# Patient Record
Sex: FEMALE | Race: BLACK OR AFRICAN AMERICAN | Employment: FULL TIME | URBAN - METROPOLITAN AREA
[De-identification: names, ages, dates, MRNs, and addresses within clinical notes are randomized per-mention and may not be internally consistent; named-entity substitution may affect disease eponyms.]

---

## 2019-01-01 ENCOUNTER — APPOINTMENT (OUTPATIENT)
Dept: CT IMAGING | Age: 49
End: 2019-01-01
Attending: EMERGENCY MEDICINE
Payer: COMMERCIAL

## 2019-01-01 ENCOUNTER — APPOINTMENT (OUTPATIENT)
Dept: CT IMAGING | Age: 49
End: 2019-01-01
Attending: PHYSICIAN ASSISTANT
Payer: COMMERCIAL

## 2019-01-01 ENCOUNTER — HOSPITAL ENCOUNTER (OUTPATIENT)
Age: 49
Setting detail: OBSERVATION
Discharge: HOME | End: 2019-01-03
Attending: EMERGENCY MEDICINE | Admitting: INTERNAL MEDICINE
Payer: COMMERCIAL

## 2019-01-01 DIAGNOSIS — I10 HYPERTENSION, UNSPECIFIED TYPE: ICD-10-CM

## 2019-01-01 DIAGNOSIS — I63.89 CEREBROVASCULAR ACCIDENT (CVA) DUE TO OTHER MECHANISM (HCC): Primary | ICD-10-CM

## 2019-01-01 DIAGNOSIS — R42 VERTIGO: ICD-10-CM

## 2019-01-01 PROBLEM — G45.9 TIA (TRANSIENT ISCHEMIC ATTACK): Status: ACTIVE | Noted: 2019-01-01

## 2019-01-01 LAB
ALBUMIN SERPL-MCNC: 3.5 G/DL (ref 3.5–5)
ALBUMIN/GLOB SERPL: 0.8 {RATIO} (ref 1.1–2.2)
ALP SERPL-CCNC: 119 U/L (ref 45–117)
ALT SERPL-CCNC: 25 U/L (ref 12–78)
AMPHET UR QL SCN: POSITIVE
ANION GAP SERPL CALC-SCNC: 9 MMOL/L (ref 5–15)
APPEARANCE UR: CLEAR
AST SERPL-CCNC: 13 U/L (ref 15–37)
BACTERIA URNS QL MICRO: ABNORMAL /HPF
BARBITURATES UR QL SCN: NEGATIVE
BASOPHILS # BLD: 0 K/UL (ref 0–0.1)
BASOPHILS NFR BLD: 0 % (ref 0–1)
BENZODIAZ UR QL: NEGATIVE
BILIRUB DIRECT SERPL-MCNC: 0.1 MG/DL (ref 0–0.2)
BILIRUB SERPL-MCNC: 0.4 MG/DL (ref 0.2–1)
BILIRUB UR QL: NEGATIVE
BUN SERPL-MCNC: 17 MG/DL (ref 6–20)
BUN/CREAT SERPL: 24 (ref 12–20)
CALCIUM SERPL-MCNC: 8.6 MG/DL (ref 8.5–10.1)
CANNABINOIDS UR QL SCN: NEGATIVE
CHLORIDE SERPL-SCNC: 103 MMOL/L (ref 97–108)
CO2 SERPL-SCNC: 28 MMOL/L (ref 21–32)
COCAINE UR QL SCN: NEGATIVE
COLOR UR: ABNORMAL
CREAT SERPL-MCNC: 0.72 MG/DL (ref 0.55–1.02)
DIFFERENTIAL METHOD BLD: ABNORMAL
DRUG SCRN COMMENT,DRGCM: ABNORMAL
EOSINOPHIL # BLD: 0.2 K/UL (ref 0–0.4)
EOSINOPHIL NFR BLD: 2 % (ref 0–7)
EPITH CASTS URNS QL MICRO: ABNORMAL /LPF
ERYTHROCYTE [DISTWIDTH] IN BLOOD BY AUTOMATED COUNT: 17.2 % (ref 11.5–14.5)
GLOBULIN SER CALC-MCNC: 4.2 G/DL (ref 2–4)
GLUCOSE BLD STRIP.AUTO-MCNC: 100 MG/DL (ref 65–100)
GLUCOSE SERPL-MCNC: 96 MG/DL (ref 65–100)
GLUCOSE UR STRIP.AUTO-MCNC: NEGATIVE MG/DL
HCT VFR BLD AUTO: 39.5 % (ref 35–47)
HGB BLD-MCNC: 13.1 G/DL (ref 11.5–16)
HGB UR QL STRIP: NEGATIVE
HYALINE CASTS URNS QL MICRO: ABNORMAL /LPF (ref 0–5)
IMM GRANULOCYTES # BLD: 0.1 K/UL (ref 0–0.04)
IMM GRANULOCYTES NFR BLD AUTO: 1 % (ref 0–0.5)
INR PPP: 1 (ref 0.9–1.1)
KETONES UR QL STRIP.AUTO: NEGATIVE MG/DL
LEUKOCYTE ESTERASE UR QL STRIP.AUTO: NEGATIVE
LYMPHOCYTES # BLD: 3.3 K/UL (ref 0.8–3.5)
LYMPHOCYTES NFR BLD: 30 % (ref 12–49)
MAGNESIUM SERPL-MCNC: 2.5 MG/DL (ref 1.6–2.4)
MCH RBC QN AUTO: 27.3 PG (ref 26–34)
MCHC RBC AUTO-ENTMCNC: 33.2 G/DL (ref 30–36.5)
MCV RBC AUTO: 82.5 FL (ref 80–99)
METHADONE UR QL: NEGATIVE
MONOCYTES # BLD: 0.7 K/UL (ref 0–1)
MONOCYTES NFR BLD: 7 % (ref 5–13)
NEUTS SEG # BLD: 6.6 K/UL (ref 1.8–8)
NEUTS SEG NFR BLD: 60 % (ref 32–75)
NITRITE UR QL STRIP.AUTO: NEGATIVE
NRBC # BLD: 0 K/UL (ref 0–0.01)
NRBC BLD-RTO: 0 PER 100 WBC
OPIATES UR QL: NEGATIVE
PCP UR QL: NEGATIVE
PH UR STRIP: 7 [PH] (ref 5–8)
PLATELET # BLD AUTO: 263 K/UL (ref 150–400)
PMV BLD AUTO: 10.7 FL (ref 8.9–12.9)
POTASSIUM SERPL-SCNC: 2.6 MMOL/L (ref 3.5–5.1)
PROT SERPL-MCNC: 7.7 G/DL (ref 6.4–8.2)
PROT UR STRIP-MCNC: NEGATIVE MG/DL
PROTHROMBIN TIME: 9.8 SEC (ref 9–11.1)
RBC # BLD AUTO: 4.79 M/UL (ref 3.8–5.2)
RBC #/AREA URNS HPF: ABNORMAL /HPF (ref 0–5)
SERVICE CMNT-IMP: NORMAL
SODIUM SERPL-SCNC: 140 MMOL/L (ref 136–145)
SP GR UR REFRACTOMETRY: 1 (ref 1–1.03)
UA: UC IF INDICATED,UAUC: ABNORMAL
UROBILINOGEN UR QL STRIP.AUTO: 1 EU/DL (ref 0.2–1)
WBC # BLD AUTO: 10.8 K/UL (ref 3.6–11)
WBC URNS QL MICRO: ABNORMAL /HPF (ref 0–4)

## 2019-01-01 PROCEDURE — 70450 CT HEAD/BRAIN W/O DYE: CPT

## 2019-01-01 PROCEDURE — 96372 THER/PROPH/DIAG INJ SC/IM: CPT

## 2019-01-01 PROCEDURE — 74011250636 HC RX REV CODE- 250/636: Performed by: INTERNAL MEDICINE

## 2019-01-01 PROCEDURE — 99285 EMERGENCY DEPT VISIT HI MDM: CPT

## 2019-01-01 PROCEDURE — 82962 GLUCOSE BLOOD TEST: CPT

## 2019-01-01 PROCEDURE — 36415 COLL VENOUS BLD VENIPUNCTURE: CPT

## 2019-01-01 PROCEDURE — 74011250636 HC RX REV CODE- 250/636: Performed by: EMERGENCY MEDICINE

## 2019-01-01 PROCEDURE — 85025 COMPLETE CBC W/AUTO DIFF WBC: CPT

## 2019-01-01 PROCEDURE — 85610 PROTHROMBIN TIME: CPT

## 2019-01-01 PROCEDURE — 65660000000 HC RM CCU STEPDOWN

## 2019-01-01 PROCEDURE — 74011250637 HC RX REV CODE- 250/637: Performed by: INTERNAL MEDICINE

## 2019-01-01 PROCEDURE — 87086 URINE CULTURE/COLONY COUNT: CPT

## 2019-01-01 PROCEDURE — 83735 ASSAY OF MAGNESIUM: CPT

## 2019-01-01 PROCEDURE — 96361 HYDRATE IV INFUSION ADD-ON: CPT

## 2019-01-01 PROCEDURE — 74011250636 HC RX REV CODE- 250/636: Performed by: PHYSICIAN ASSISTANT

## 2019-01-01 PROCEDURE — 96366 THER/PROPH/DIAG IV INF ADDON: CPT

## 2019-01-01 PROCEDURE — 80076 HEPATIC FUNCTION PANEL: CPT

## 2019-01-01 PROCEDURE — 80307 DRUG TEST PRSMV CHEM ANLYZR: CPT

## 2019-01-01 PROCEDURE — 96365 THER/PROPH/DIAG IV INF INIT: CPT

## 2019-01-01 PROCEDURE — 70496 CT ANGIOGRAPHY HEAD: CPT

## 2019-01-01 PROCEDURE — 93005 ELECTROCARDIOGRAM TRACING: CPT

## 2019-01-01 PROCEDURE — 74011250637 HC RX REV CODE- 250/637: Performed by: EMERGENCY MEDICINE

## 2019-01-01 PROCEDURE — 81001 URINALYSIS AUTO W/SCOPE: CPT

## 2019-01-01 PROCEDURE — 99218 HC RM OBSERVATION: CPT

## 2019-01-01 PROCEDURE — 96360 HYDRATION IV INFUSION INIT: CPT

## 2019-01-01 PROCEDURE — 80048 BASIC METABOLIC PNL TOTAL CA: CPT

## 2019-01-01 PROCEDURE — 94762 N-INVAS EAR/PLS OXIMTRY CONT: CPT

## 2019-01-01 PROCEDURE — 74011636320 HC RX REV CODE- 636/320

## 2019-01-01 RX ORDER — LABETALOL HYDROCHLORIDE 5 MG/ML
5 INJECTION, SOLUTION INTRAVENOUS
Status: DISCONTINUED | OUTPATIENT
Start: 2019-01-01 | End: 2019-01-03 | Stop reason: HOSPADM

## 2019-01-01 RX ORDER — POTASSIUM CHLORIDE 1.5 G/1.77G
40 POWDER, FOR SOLUTION ORAL
Status: COMPLETED | OUTPATIENT
Start: 2019-01-01 | End: 2019-01-01

## 2019-01-01 RX ORDER — ONDANSETRON 2 MG/ML
4 INJECTION INTRAMUSCULAR; INTRAVENOUS
Status: DISCONTINUED | OUTPATIENT
Start: 2019-01-01 | End: 2019-01-03 | Stop reason: HOSPADM

## 2019-01-01 RX ORDER — GUAIFENESIN 100 MG/5ML
81 LIQUID (ML) ORAL DAILY
Status: DISCONTINUED | OUTPATIENT
Start: 2019-01-02 | End: 2019-01-03 | Stop reason: HOSPADM

## 2019-01-01 RX ORDER — ATORVASTATIN CALCIUM 40 MG/1
40 TABLET, FILM COATED ORAL
Status: DISCONTINUED | OUTPATIENT
Start: 2019-01-01 | End: 2019-01-03 | Stop reason: HOSPADM

## 2019-01-01 RX ORDER — ENOXAPARIN SODIUM 100 MG/ML
40 INJECTION SUBCUTANEOUS EVERY 12 HOURS
Status: DISCONTINUED | OUTPATIENT
Start: 2019-01-01 | End: 2019-01-03 | Stop reason: HOSPADM

## 2019-01-01 RX ORDER — SODIUM CHLORIDE 0.9 % (FLUSH) 0.9 %
5-10 SYRINGE (ML) INJECTION AS NEEDED
Status: DISCONTINUED | OUTPATIENT
Start: 2019-01-01 | End: 2019-01-03 | Stop reason: HOSPADM

## 2019-01-01 RX ORDER — MECLIZINE HYDROCHLORIDE 25 MG/1
25 TABLET ORAL 3 TIMES DAILY
Status: DISCONTINUED | OUTPATIENT
Start: 2019-01-01 | End: 2019-01-03 | Stop reason: HOSPADM

## 2019-01-01 RX ORDER — POTASSIUM CHLORIDE 7.45 MG/ML
10 INJECTION INTRAVENOUS
Status: COMPLETED | OUTPATIENT
Start: 2019-01-01 | End: 2019-01-01

## 2019-01-01 RX ORDER — SODIUM CHLORIDE 0.9 % (FLUSH) 0.9 %
5-10 SYRINGE (ML) INJECTION EVERY 8 HOURS
Status: DISCONTINUED | OUTPATIENT
Start: 2019-01-01 | End: 2019-01-03 | Stop reason: HOSPADM

## 2019-01-01 RX ORDER — ACETAMINOPHEN 325 MG/1
650 TABLET ORAL
Status: DISCONTINUED | OUTPATIENT
Start: 2019-01-01 | End: 2019-01-03 | Stop reason: HOSPADM

## 2019-01-01 RX ORDER — DIPHENHYDRAMINE HCL 25 MG
25 CAPSULE ORAL
Status: DISCONTINUED | OUTPATIENT
Start: 2019-01-01 | End: 2019-01-03 | Stop reason: HOSPADM

## 2019-01-01 RX ORDER — ACETAMINOPHEN 650 MG/1
650 SUPPOSITORY RECTAL
Status: DISCONTINUED | OUTPATIENT
Start: 2019-01-01 | End: 2019-01-03 | Stop reason: HOSPADM

## 2019-01-01 RX ORDER — ASPIRIN 325 MG
325 TABLET ORAL ONCE
Status: COMPLETED | OUTPATIENT
Start: 2019-01-01 | End: 2019-01-01

## 2019-01-01 RX ORDER — ENOXAPARIN SODIUM 100 MG/ML
40 INJECTION SUBCUTANEOUS EVERY 24 HOURS
Status: DISCONTINUED | OUTPATIENT
Start: 2019-01-01 | End: 2019-01-01

## 2019-01-01 RX ADMIN — MECLIZINE 25 MG: 12.5 TABLET ORAL at 15:53

## 2019-01-01 RX ADMIN — POTASSIUM CHLORIDE 40 MEQ: 1.5 POWDER, FOR SOLUTION ORAL at 15:53

## 2019-01-01 RX ADMIN — MECLIZINE 25 MG: 12.5 TABLET ORAL at 23:01

## 2019-01-01 RX ADMIN — SODIUM CHLORIDE 1000 ML: 900 INJECTION, SOLUTION INTRAVENOUS at 12:15

## 2019-01-01 RX ADMIN — ENOXAPARIN SODIUM 40 MG: 40 INJECTION SUBCUTANEOUS at 15:54

## 2019-01-01 RX ADMIN — Medication 10 ML: at 23:02

## 2019-01-01 RX ADMIN — SODIUM CHLORIDE 1000 ML: 900 INJECTION, SOLUTION INTRAVENOUS at 15:53

## 2019-01-01 RX ADMIN — ASPIRIN 325 MG: 325 TABLET ORAL at 15:53

## 2019-01-01 RX ADMIN — POTASSIUM CHLORIDE 10 MEQ: 7.46 INJECTION, SOLUTION INTRAVENOUS at 15:54

## 2019-01-01 RX ADMIN — ATORVASTATIN CALCIUM 40 MG: 40 TABLET, FILM COATED ORAL at 23:01

## 2019-01-01 RX ADMIN — Medication 10 ML: at 14:06

## 2019-01-01 RX ADMIN — SODIUM CHLORIDE 1000 ML: 900 INJECTION, SOLUTION INTRAVENOUS at 11:38

## 2019-01-01 RX ADMIN — IOPAMIDOL 100 ML: 755 INJECTION, SOLUTION INTRAVENOUS at 10:38

## 2019-01-01 RX ADMIN — POTASSIUM CHLORIDE 10 MEQ: 7.46 INJECTION, SOLUTION INTRAVENOUS at 17:03

## 2019-01-01 NOTE — PROGRESS NOTES
Pharmacy  Enoxaparin (Lovenox®) Monitoring Indication: DVT px 
  
Current Dose: Enoxaparin 40 mg subcutaneously every 24 hours Creatinine Clearance (mL/min): >100 mL/min Current Weight: 108.9 Kg (BMI > 40) Labs: 
Recent Labs 01/01/19 
1051 CREA 0.72 HGB 13.1  INR 1.0 Wt Readings from Last 1 Encounters:  
01/01/19 108.9 kg (240 lb) Ht Readings from Last 1 Encounters:  
01/01/19 152.4 cm (60\") Impression/Plan: · Will adjust enoxaparin to every 12 hour dosing due to patient BMI > 40 per protocol Thanks, DENNYS LevyD 
 
  [email or web address]/Lovenox%20Dose%20Adjustment%20protocol. pdf

## 2019-01-01 NOTE — ED PROVIDER NOTES
EMERGENCY DEPARTMENT HISTORY AND PHYSICAL EXAM 
 
 
Date: 1/1/2019 Patient Name: Tahira Lyons History of Presenting Illness Chief Complaint Patient presents with  
 Headache  
  onset on waking to day  Dizziness History of TIAs, last one 2012 History Provided By: Patient and Patient's  HPI: Tahira Lyons, 51 y.o. female with PMHx significant for HTN, stroke, TIA, presents via EMS to the ED with cc of new onset HA x 2 hours upon waking with associated 2nd and 3rd fingers \"tingling\", light-headedness, and dizziness. She declares hx of TIA with one in 2011 where she experienced vision problems in her R eye and another in 2012 where she experienced R sided weakness. Pt states that she did not experience vertigo sxs with prior TIAs. Pts  noted that she had to be helped into the bed due to difficulty standing. She states she went to bed at midnight last night with no sxs, and woke at 0600 with sxs. Pt denies any recent travel, head injuries, or roller-coaster use. She notes regular nicotine consumption. Pt specifically denies any modifying factors. She specifically denies any nausea or vomiting. There are no other complaints, changes, or physical findings at this time. PCP: Stacy Lira MD 
 
No current facility-administered medications on file prior to encounter. Current Outpatient Medications on File Prior to Encounter Medication Sig Dispense Refill  amLODIPine 10 mg tab 10 mg, olmesartan 40 mg tab 40 mg Take 1 Dose by mouth daily.  topiramate (TOPAMAX) 25 mg tablet Take 25 mg by mouth two (2) times a day.  butalbital-acetaminophen-caffeine (FIORICET) -40 mg per tablet Take 1-2 Tabs by mouth every four (4) hours as needed for Pain. Max Daily Amount: 12 Tabs. 20 Tab 0  
 metoprolol (LOPRESSOR) 100 mg tablet Take 200 mg by mouth daily.  rosuvastatin (CRESTOR) 10 mg tablet Take 10 mg by mouth nightly.  furosemide (LASIX) 40 mg tablet Take  by mouth daily. Past History Past Medical History: 
Past Medical History:  
Diagnosis Date  Essential hypertension   Hypertension  MARIA M (obstructive sleep apnea)  Stroke ([address]  TIA (transient ischemic attack) 2011 Past Surgical History: 
Past Surgical History:  
Procedure Laterality Date  HX  SECTION  1989  
 HX PARTIAL HYSTERECTOMY Family History: 
Family History Problem Relation Age of Onset  Kidney Disease Mother  Stroke Paternal Uncle  Heart Disease Maternal Grandfather  Stroke Paternal Grandmother  Cancer Paternal Grandmother Social History: 
Social History Tobacco Use  Smoking status: Current Every Day Smoker Packs/day: 1.50 Years: 20.00 Pack years: 30.00 Types: Cigarettes Substance Use Topics  Alcohol use: No  
 Drug use: No  
 
 
Allergies: Allergies Allergen Reactions  Pcn [Penicillins] Anaphylaxis Review of Systems Review of Systems Gastrointestinal: Negative for nausea and vomiting. Neurological: Positive for dizziness, light-headedness and headaches. Positive for tingling on 2nd and 3rd fingers. All other systems reviewed and are negative. Physical Exam  
Physical Exam  
Vital signs and nursing notes reviewed CONSTITUTIONAL: Alert, in moderate distress; well-developed; well-nourished. HEAD:  Normocephalic, atraumatic EYES: PERRL; EOM's intact. ENTM: Nose: no rhinorrhea; Throat: no erythema or exudate, mucous membranes moist 
Neck:  Supple. trachea is midline. RESP: Chest clear, equal breath sounds. - W/R/R 
CV: S1 and S2 WNL; No murmurs, gallops or rubs. 2+ radial and DP pulses bilaterally. GI: non-distended, normal bowel sounds, abdomen soft and non-tender. No masses or organomegaly. : No costo-vertebral angle tenderness. BACK:  Non-tender, normal appearance UPPER EXT:  Normal inspection. no joint or soft tissue swelling LOWER EXT: No edema, no calf tenderness. NEURO: Alert and oriented x3, unable to stand at bedside, answers questions correctly, preforms tasks correctly. Slight R sided leg and arm weakness and decreased light touch sensation. No facial droup, no neglect, no dysarthria. SKIN: No rashes; Warm and dry PSYCH: Normal mood, normal affect Diagnostic Study Results Labs - Recent Results (from the past 12 hour(s)) EKG, 12 LEAD, INITIAL Collection Time: 01/01/19 10:37 AM  
Result Value Ref Range Ventricular Rate 74 BPM  
 Atrial Rate 74 BPM  
 P-R Interval 158 ms QRS Duration 86 ms  
 Q-T Interval 404 ms QTC Calculation (Bezet) 448 ms Calculated P Axis 40 degrees Calculated R Axis 21 degrees Calculated T Axis 17 degrees Diagnosis Normal sinus rhythm Possible Left atrial enlargement Nonspecific T wave abnormality When compared with ECG of 02-DEC-2012 11:43, Nonspecific T wave abnormality now evident in Anterior leads CBC WITH AUTOMATED DIFF Collection Time: 01/01/19 10:51 AM  
Result Value Ref Range WBC 10.8 3.6 - 11.0 K/uL  
 RBC 4.79 3.80 - 5.20 M/uL  
 HGB 13.1 11.5 - 16.0 g/dL HCT 39.5 35.0 - 47.0 % MCV 82.5 80.0 - 99.0 FL  
 MCH 27.3 26.0 - 34.0 PG  
 MCHC 33.2 30.0 - 36.5 g/dL  
 RDW 17.2 (H) 11.5 - 14.5 % PLATELET 199 100 - 360 K/uL MPV 10.7 8.9 - 12.9 FL  
 NRBC 0.0 0  WBC ABSOLUTE NRBC 0.00 0.00 - 0.01 K/uL NEUTROPHILS 60 32 - 75 % LYMPHOCYTES 30 12 - 49 % MONOCYTES 7 5 - 13 % EOSINOPHILS 2 0 - 7 % BASOPHILS 0 0 - 1 % IMMATURE GRANULOCYTES 1 (H) 0.0 - 0.5 % ABS. NEUTROPHILS 6.6 1.8 - 8.0 K/UL  
 ABS. LYMPHOCYTES 3.3 0.8 - 3.5 K/UL  
 ABS. MONOCYTES 0.7 0.0 - 1.0 K/UL  
 ABS. EOSINOPHILS 0.2 0.0 - 0.4 K/UL  
 ABS. BASOPHILS 0.0 0.0 - 0.1 K/UL  
 ABS. IMM. GRANS. 0.1 (H) 0.00 - 0.04 K/UL  
 DF AUTOMATED METABOLIC PANEL, BASIC  
 Collection Time: 01/01/19 10:51 AM  
Result Value Ref Range Sodium 140 136 - 145 mmol/L Potassium 2.6 (LL) 3.5 - 5.1 mmol/L Chloride 103 97 - 108 mmol/L  
 CO2 28 21 - 32 mmol/L Anion gap 9 5 - 15 mmol/L Glucose 96 65 - 100 mg/dL BUN 17 6 - 20 MG/DL Creatinine 0.72 0.55 - 1.02 MG/DL  
 BUN/Creatinine ratio 24 (H) 12 - 20 GFR est AA >60 >60 ml/min/1.73m2 GFR est non-AA >60 >60 ml/min/1.73m2 Calcium 8.6 8.5 - 10.1 MG/DL PROTHROMBIN TIME + INR Collection Time: 01/01/19 10:51 AM  
Result Value Ref Range INR 1.0 0.9 - 1.1 Prothrombin time 9.8 9.0 - 11.1 sec HEPATIC FUNCTION PANEL Collection Time: 01/01/19 10:51 AM  
Result Value Ref Range Protein, total 7.7 6.4 - 8.2 g/dL Albumin 3.5 3.5 - 5.0 g/dL Globulin 4.2 (H) 2.0 - 4.0 g/dL A-G Ratio 0.8 (L) 1.1 - 2.2 Bilirubin, total 0.4 0.2 - 1.0 MG/DL Bilirubin, direct 0.1 0.0 - 0.2 MG/DL Alk. phosphatase 119 (H) 45 - 117 U/L  
 AST (SGOT) 13 (L) 15 - 37 U/L  
 ALT (SGPT) 25 12 - 78 U/L  
GLUCOSE, POC Collection Time: 01/01/19 11:21 AM  
Result Value Ref Range Glucose (POC) 100 65 - 100 mg/dL Performed by Charlotte Phelps (SON) URINALYSIS W/ REFLEX CULTURE Collection Time: 01/01/19 11:38 AM  
Result Value Ref Range Color YELLOW/STRAW Appearance CLEAR CLEAR Specific gravity 1.005 1.003 - 1.030    
 pH (UA) 7.0 5.0 - 8.0 Protein NEGATIVE  NEG mg/dL Glucose NEGATIVE  NEG mg/dL Ketone NEGATIVE  NEG mg/dL Bilirubin NEGATIVE  NEG Blood NEGATIVE  NEG Urobilinogen 1.0 0.2 - 1.0 EU/dL Nitrites NEGATIVE  NEG Leukocyte Esterase NEGATIVE  NEG    
 UA:UC IF INDICATED URINE CULTURE ORDERED (A) CNI    
 WBC 0-4 0 - 4 /hpf  
 RBC 0-5 0 - 5 /hpf Epithelial cells FEW FEW /lpf Bacteria 1+ (A) NEG /hpf Hyaline cast 0-2 0 - 5 /lpf Radiologic Studies -  
CTA CODE NEURO HEAD AND NECK W CONT Final Result Impression: No acute large vessel arterial occlusion or significant stenosis. CT CODE NEURO HEAD WO CONTRAST Final Result IMPRESSION: Normal.  
  
  
 
CT Results  (Last 48 hours) 01/01/19 1103  CTA CODE NEURO HEAD AND NECK W CONT Final result Impression:  Impression: No acute large vessel arterial occlusion or significant stenosis. Narrative:  PRELIMINARY REPORT No large vessel occlusion. Preliminary report was provided by Dr. Salina Whaley, the on-call radiologist, at  
11:46 Final report to follow. END PRELIMINARY REPORT  
   
CLINICAL HISTORY: Vertigo and right-sided weakness EXAMINATION:  CT ANGIOGRAPHY HEAD AND NECK COMPARISON: CT head 1/1/2019, 12/2/2012 TECHNIQUE:  Following the uneventful administration of iodinated contrast  
material, axial CT angiography of the head and neck was performed. Delayed axial  
images through the head were also obtained. Coronal and sagittal reconstructions  
were obtained. Manual postprocessing of images was performed. 3-D  Sagittal  
maximal intensity projection images were obtained. 3-D Coronal maximal  
intensity projections were obtained. CT dose reduction was achieved through use  
of a standardized protocol tailored for this examination and automatic exposure  
control for dose modulation. FINDINGS:  
   
Delayed contrast-enhanced head CT: The ventricles are midline without hydrocephalus. There is no acute intra or  
extra-axial hemorrhage. Gray-white matter differentiation is preserved. The  
basal cisterns are clear. The paranasal sinuses are clear. CTA NECK:  
   
Great vessels: Normal arch anatomy with the origins patent. Right subclavian artery: Patent Left subclavian artery: Patent Right common carotid artery: Patent Left common carotid artery: Patent Cervical right internal carotid artery: Patent with no significant stenosis by NASCET criteria. Cervical left internal carotid artery: Patent with no significant stenosis by NASCET criteria. Right vertebral artery: Patent Left vertebral artery: Patent Mild cervical spondylosis CTA HEAD:  
   
Right cavernous internal carotid artery: Patent Left cavernous internal carotid artery: Patent Anterior cerebral arteries: Patent Anterior communicating artery: Patent Right middle cerebral artery: Patent Left middle cerebral artery: Patent Posterior communicating arteries: Left is patent. Right is hypoplastic Posterior cerebral arteries: Patent Basilar artery: Patent Distal vertebral arteries: Patent 01/01/19 1057  CT CODE NEURO HEAD WO CONTRAST Final result Impression:  IMPRESSION: Normal.  
   
  
 Narrative:  EXAM: CT CODE NEURO HEAD WO CONTRAST INDICATION: acute vertigo R sided weakness symptoms COMPARISON: CT 12/2/2012. Sarthak Calle CONTRAST: None. TECHNIQUE: Unenhanced CT of the head was performed using 5 mm images. Brain and  
bone windows were generated. CT dose reduction was achieved through use of a  
standardized protocol tailored for this examination and automatic exposure  
control for dose modulation. FINDINGS:  
The ventricles and sulci are normal in size, shape and configuration and  
midline. There is no significant white matter disease. There is no intracranial  
hemorrhage, extra-axial collection, mass, mass effect or midline shift. The  
basilar cisterns are open. No acute infarct is identified. The bone windows  
demonstrate no abnormalities. The visualized portions of the paranasal sinuses  
and mastoid air cells are clear. Medical Decision Making I am the first provider for this patient.  
 
I reviewed the vital signs, available nursing notes, past medical history, past surgical history, family history and social history. Vital Signs-Reviewed the patient's vital signs. Patient Vitals for the past 12 hrs: 
 Temp Pulse Resp BP SpO2  
01/01/19 0956 97.8 °F (36.6 °C) 82 18 148/82 100 % Pulse Oximetry Analysis - 100% on RA Cardiac Monitor:  
Rate: 82 bpm 
Rhythm: Normal Sinus Rhythm EKG interpretation: (Preliminary) 10:37 Sinus rhythm 74, nml axis, nml interval, no ischemic changes Written by Pietro Parikh, ED Scribe, as dictated by Aishwarya Navarro MD. Records Reviewed: Nursing Notes, Old Medical Records, Ambulance Run Sheet, Previous Radiology Studies and Previous Laboratory Studies Provider Notes (Medical Decision Making):  
Pt is a 50 yo female with concern for stroke with waking sxs this AM. Pt did not arrive in ED until 10 hours after last known well time. No evidence of large vessel occlusion on CTA, not a candidate for TPA given timeline. Plan for permissive HTN, aspirin therapy, admission for further neurological work-up. ED Course:  
Initial assessment performed. The patients presenting problems have been discussed, and they are in agreement with the care plan formulated and outlined with them. I have encouraged them to ask questions as they arise throughout their visit. Davina Barboza 1970 Arrival time to ED: 09:52 Code S Called: 10:37 Physician at Bedside: 10:25 CT Order Time: 10:36 ACT Page: 10:41 ACT Call Back: 10:45 Cancel Code S: 10:46 CONSULT NOTE:  
10:45 AM 
Aman Miranda MD spoke with Dr. Geovanna Gasca, Specialty: Neurology Discussed pt's hx, disposition, and available diagnostic and imaging results. Reviewed care plans. Consultant agrees with plans as outlined. Dr. Geovanna Gasca agrees with plan for CT. Written by Pietro Parikh, ED Scribe, as dictated by Aman Miranda MD. 
 
CONSULT NOTE:  
1:18 PM 
Aman Miranda MD spoke with Dr. Carlitos Fernandez, Specialty: Hospitalist 
 Discussed pt's hx, disposition, and available diagnostic and imaging results. Reviewed care plans. Consultant will evaluate pt for admission. Written by Manuela Lawrence ED Scribe, as dictated by Joel Kate MD. Critical Care Time:  
0 minutes. Disposition: 
ADMIT NOTE: 
1:18 PM 
The patient is being admitted to the hospital by Dr. Rosa Mckeon. The results of their tests and reasons for their admission have been discussed with the patient and/or available family. They convey agreement and understanding for the need to be admitted and for their admission diagnosis. PLAN: 
1. Admit to hospitalist. 
 
 
Diagnosis Clinical Impression: 1. Cerebrovascular accident (CVA) due to other mechanism 2. Vertigo 3. Hypertension, unspecified type Attestations: This note is prepared by Manuela Lawrence, acting as Scribe for Joel Kate MD. 
 
Joel Kate MD: The scribe's documentation has been prepared under my direction and personally reviewed by me in its entirety. I confirm that the note above accurately reflects all work, treatment, procedures, and medical decision making performed by me.

## 2019-01-01 NOTE — PROGRESS NOTES
TRANSFER - IN REPORT: 
 
Verbal report received from Bi Wang R.N(name) on Robert Torres  being received from ed (unit) for routine progression of care Report consisted of patients Situation, Background, Assessment and  
Recommendations(SBAR). Information from the following report(s) SBAR, Kardex, ED Summary, MAR, Recent Results and Cardiac Rhythm NSR was reviewed with the receiving nurse. Opportunity for questions and clarification was provided. Assessment completed upon patients arrival to unit and care assumed.

## 2019-01-01 NOTE — ED NOTES
10:06 AM 
ePpe Haynes PA-C  has evaluated the patient as the [facility] provider. They have reviewed the vital signs and the triage nurse assessment. They have talked with the patient and any available family and advised that the appropriate studies have been ordered to initiate the work up based on the clinical presentation during the assessment. The pt has been advised that they will be accommodated in the Main ED as soon as possible. Pt is here to be evaluated for a HA with associated tingling to R 3 and 4th finger. Pt states that this HA is different than her usual HAs because \"I can usually do things for myself. \" She describes this as going to the bathroom today and \"then just stopping. \" Her visitor could not help her so he called EMS. Pt has a neurologist that she sees but she cannot recall his name.

## 2019-01-01 NOTE — ED NOTES
TRANSFER - OUT REPORT: 
 
Verbal report given to Erica Gurrola RN (name) on Noemi Cardozo  being transferred to [address] [place] [address] (unit) for routine progression of care Report consisted of patients Situation, Background, Assessment and  
Recommendations(SBAR). Information from the following report(s) SBAR was reviewed with the receiving nurse. Lines:  
Peripheral IV 01/01/19 Left Antecubital (Active) Site Assessment Clean, dry, & intact 1/1/2019 10:57 AM  
Phlebitis Assessment 0 1/1/2019 10:57 AM  
Infiltration Assessment 0 1/1/2019 10:57 AM  
Dressing Status Clean, dry, & intact 1/1/2019 10:57 AM  
  
 
Opportunity for questions and clarification was provided.

## 2019-01-01 NOTE — PROGRESS NOTES
* No surgery found * 
* No surgery found * Bedside and Verbal shift change report given to Milka Frazier R.N  (oncoming nurse) by Dequan Webb R.N (offgoing nurse). Report included the following information ED Summary, MAR and Cardiac Rhythm nsr. Zone Phone:   [phone] Significant changes during shift:  New admit Patient Information Chyrl Severin 51 y.o. 
1/1/2019  9:57 AM by Taurus Martinez MD. Chyrl Severin was admitted from Home 
 
Problem List 
 
Patient Active Problem List  
 Diagnosis Date Noted  TIA (transient ischemic attack) 01/01/2019  Morbid obesity ([facility] [address] 08/05/2013 Past Medical History:  
Diagnosis Date  Essential hypertension 2010  Hypertension  MARIA M (obstructive sleep apnea)  Stroke ([facility] [address]  TIA (transient ischemic attack) 2011 2012 Core Measures: CVA: Yes Yes CHF:No No 
PNA:No No 
 
Post Op Surgical (If Applicable):  
 
Number times ambulated in hallway past shift:  NONE Number of times OOB to chair past shift:   YES 
NG Tube: No 
Incentive Spirometer: No 
Drains: No   Volume Dressing Present:  No 
Flatus:  Not applicable Activity Status: OOB to Chair Yes Ambulated this shift No  
Bed Rest No 
 
Supplemental O2: (If Applicable) NC No 
NRB No 
Venti-mask No 
On  Liters/min LINES AND DRAINS: 
Central Line? No  
 
PICC LINE? No  
 
Urinary Catheter? No  
 
DVT prophylaxis: DVT prophylaxis Med- Yes DVT prophylaxis SCD or ROHITH- No  
 
Wounds: (If Applicable) Wounds- No 
 
Location Patient Safety: 
 
Falls Score Total Score: 1 Safety Level_______ Bed Alarm On? No 
Sitter? No 
 
Plan for upcoming shift: mri,echo Discharge Plan: No  
 
Active Consults: 
IP CONSULT TO NEUROLOGY

## 2019-01-01 NOTE — ED NOTES
Attempted to call report to IP nurse, but she is unavailable and will call the ED as soon as possible. Spoke with Jason Up RN-she is aware that a transport order is being placed now.

## 2019-01-01 NOTE — PROGRESS NOTES
Spiritual Care Assessment/Progress Note [address] 
 
 
NAME: Shady Bone      MRN: [id number] AGE: 51 y.o. SEX: female Mosque Affiliation: Easiest Credit Card To Get Approved For  
Language: [place] 1/1/2019     Total Time (in minutes): 11 Spiritual Assessment begun in [facility] EMERGENCY DEPT through conversation with: 
  
    [x]Patient        [] Family    [] Friend(s) Reason for Consult: Other (comment)(CODE S) Spiritual beliefs: (Please include comment if needed) [x] Identifies with a lalito tradition: Sabianism     
   [] Supported by a lalito community:        
   [] Claims no spiritual orientation:       
   [] Seeking spiritual identity:            
   [] Adheres to an individual form of spirituality:       
   [] Not able to assess:                   
 
    
Identified resources for coping:  
   [] Prayer                           
   [] Music                  [] Guided Imagery [x] Family/friends                 [] Pet visits [] Devotional reading                         [] Unknown 
   [] Other:                                        
 
 
Interventions offered during this visit: (See comments for more details) Family/Friend(s): Affirmation of emotions/emotional suffering, Coping skills reviewed/reinforced, Iconic (affirming the presence of God/Higher Power)(Friend) Plan of Care: 
 
 [x] Support spiritual and/or cultural needs  
 [] Support AMD and/or advance care planning process    
 [] Support grieving process 
 [] Coordinate Rites and/or Rituals  
 [] Coordination with community clergy [] No spiritual needs identified at this time 
 [] Detailed Plan of Care below (See Comments)  [] Make referral to Music Therapy 
[] Make referral to Pet Therapy    
[] Make referral to Addiction services 
[] Make referral to [facility] 
[] Make referral to Spiritual Care Partner 
[] No future visits requested       
[] Follow up visits as needed Comments:  responded to [facility] [place] [address] Level 2. Staff providing care with patient, friend waiting in [place]. Spoke briefly to friend about current thoughts, feelings, concerns. He appeared encouraged as a result of this visit and expressed gratitude for this visit. Visited by Rev. Santo Larsen, [facility]  paging service: [phone])

## 2019-01-01 NOTE — ED NOTES
CATRACHITO Car evaluating Pt reports feeling \"lightheaded\" and having R 2nd and 3rd fingers \"tingling\" ongoing x 8 am upon awakening. +headache x this am Pt reports hx of migraines but states this headache is different stating that with usual migraine headaches she can \"do things for myself\" Pt family member states that he \"couldn't help her\" so he called EMS. Pt also reports hx of TIA

## 2019-01-01 NOTE — H&P
Hospitalist Admission NoteNAME: Lul Grande :  1970 MRN:  [id number] Date/Time:  2019 2:06 PM 
 
Patient PCP: Jeanette Calle MD 
______________________________________________________________________ Given the patient's current clinical presentation, I have a high level of concern for decompensation if discharged from the emergency department. Complex decision making was performed, which includes reviewing the patient's available past medical records, laboratory results, and x-ray films. My assessment of this patient's clinical condition and my plan of care is as follows. Assessment / Plan: 
 
TIA/CVA presenting symptoms: dizizness/blurry vision/rt fingers tingling 
-admit to observation 
-neuro checks per protocol 
elevated head of bed 
-lipid panel, TSH, A1C/t Ua Tox 
-allow for permissive hypertension?with labetalol?prn for? BP >?220/120 Ct head- Normal. 
-CTA No acute large vessel arterial occlusion or significant stenosis. MRI  Brain -ECHO 
- ASA/ statin  
-PT/OT consult 
-neurology consulted 
 - trial meclizine Hypokalemia 
replace prn Check mag Follow labs Hypertension. see abobe resume meds in am  
/a1c Hyperlipidemia. lipids in am  
lipitor Obesity. Will ask nutritionist to see pt  
tob dep declined patch  
  
  
  
Code Status: Full code Surrogate Decision Maker:  
  
DVT Prophylaxis: Lovenox GI Prophylaxis: not indicated 
  
Baseline: ambulatory Subjective: CHIEF COMPLAINT: headache/dizziness/numbness lft arm HISTORY OF PRESENT ILLNESS:    
Lul Grande is a 51 y.o.   female with PMHx significant for HTN, H/O TIA, presents via EMS to the ED with cc of new onset HA x 2 hours upon waking with associated 2nd and 3rd fingers \"tingling\", light-headedness, and dizziness.  
 hx of TIA with one in  where she experienced vision problems in her R eye and another in  where she experienced R sided weakness all resolved. Pt states that she did not experience vertigo sxs with prior TIAs. Pts  noted that she had to be helped into the bed due to difficulty standing. She states she went to bed at midnight last night with no sxs, and woke at 0600 with sxs. Pt denies any recent travel, head injuries, or roller-coaster use. Pt specifically denies any modifying factors. She specifically denies any nausea or vomiting.  
+ smoker. 
 
  
 
 
 
We were asked to admit for work up and evaluation of the above problems. Past Medical History:  
Diagnosis Date  Essential hypertension   Hypertension  MARIA M (obstructive sleep apnea)  [address]roke [address] [address]  TIA (transient ischemic attack) 2011 Past Surgical History:  
Procedure Laterality Date  HX  SECTION  1989  
 HX PARTIAL HYSTERECTOMY Social History Tobacco Use  Smoking status: Current Every Day Smoker Packs/day: 1.50 Years: 20.00 Pack years: 30.00 Types: Cigarettes Substance Use Topics  Alcohol use: No  
  
 
Family History Problem Relation Age of Onset  Kidney Disease Mother  Stroke Paternal Uncle  Heart Disease Maternal Grandfather  Stroke Paternal Grandmother  Cancer Paternal Grandmother Allergies Allergen Reactions  Pcn [Penicillins] Anaphylaxis Prior to Admission medications Medication Sig Start Date End Date Taking? Authorizing Provider  
amLODIPine 10 mg tab 10 mg, olmesartan 40 mg tab 40 mg Take 1 Dose by mouth daily. Other, MD Royer  
topiramate (TOPAMAX) 25 mg tablet Take 25 mg by mouth two (2) times a day. Other, MD Royer  
butalbital-acetaminophen-caffeine (FIORICET) -40 mg per tablet Take 1-2 Tabs by mouth every four (4) hours as needed for Pain. Max Daily Amount: 12 Tabs. 11/22/15   Jeremie Coyne MD  
metoprolol (LOPRESSOR) 100 mg tablet Take 200 mg by mouth daily.     Provider, Historical  
 rosuvastatin (CRESTOR) 10 mg tablet Take 10 mg by mouth nightly. Provider, Historical  
furosemide (LASIX) 40 mg tablet Take  by mouth daily. Provider, Historical  
 
 
REVIEW OF SYSTEMS:    
I am not able to complete the review of systems because: The patient is intubated and sedated The patient has altered mental status due to his acute medical problems The patient has baseline aphasia from prior stroke(s) The patient has baseline dementia and is not reliable historian The patient is in acute medical distress and unable to provide information Total of 12 systems reviewed as follows:   
   POSITIVE= underlined text  Negative = text not underlined General:  fever, chills, sweats, generalized weakness, weight loss/gain,  
   loss of appetite Eyes:    blurred vision, eye pain, loss of vision, double vision ENT:    rhinorrhea, pharyngitis Respiratory:   cough, sputum production, SOB, GANDHI, wheezing, pleuritic pain  
Cardiology:   chest pain, palpitations, orthopnea, PND, edema, syncope Gastrointestinal:  abdominal pain , N/V, diarrhea, dysphagia, constipation, bleeding Genitourinary:  frequency, urgency, dysuria, hematuria, incontinence Muskuloskeletal :  arthralgia, myalgia, back pain Hematology:  easy bruising, nose or gum bleeding, lymphadenopathy Dermatological: rash, ulceration, pruritis, color change / jaundice Endocrine:   hot flashes or polydipsia Neurological:  headache, dizziness, confusion, focal weakness, paresthesia, rt fingers Speech difficulties, memory loss, gait difficulty Psychological: Feelings of anxiety, depression, agitation Objective: VITALS:   
Visit Vitals /77 (BP 1 Location: Right arm, BP Patient Position: At rest) Pulse 86 Temp 97.8 °F (36.6 °C) Resp 19 Ht 5' (1.524 m) Wt 108.9 kg (240 lb) SpO2 97% BMI 46.87 kg/m² PHYSICAL EXAM: 
 
General:    Alert, cooperative, obese  no distress, appears stated age. HEENT: Atraumatic, anicteric sclerae, pink conjunctivae No oral ulcers, mucosa moist, throat clear, dentition fair Neck:  Supple, symmetrical,  thyroid: non tender Lungs:   Clear to auscultation bilaterally. No Wheezing or Rhonchi. No rales. Chest wall:  No tenderness  No Accessory muscle use. Heart:   Regular  rhythm,  No  murmur   No edema Abdomen:   Soft, obese non-tender. Not distended. Bowel sounds normal 
Extremities: No cyanosis. No clubbing,   
  Skin turgor normal, Capillary refill normal, Radial dial pulse 2+ Skin:     Not pale. Not Jaundiced  No rashes Psych:  Fair  insight. Not depressed. Not anxious or agitated. Neurologic: EOMs intact. No facial asymmetry. No aphasia or slurred speech. Symmetrical strength, Sensation grossly intact. Alert and oriented X 4.  
 
_______________________________________________________________________ Care Plan discussed with: 
  Comments Patient x Family  x   
RN x Care Manager Consultant:  x   
_______________________________________________________________________ Expected  Disposition:  
Home with Family x HH/PT/OT/RN   
SNF/LTC   
RAEANN   
________________________________________________________________________ TOTAL TIME:  60  Minutes Critical Care Provided     Minutes non procedure based Comments  
 x Reviewed previous records  
>50% of visit spent in counseling and coordination of care x Discussion with patient and/or family and questions answered 
  
 
________________________________________________________________________ Signed: Sharron Rasmussen MD 
 
Procedures: see electronic medical records for all procedures/Xrays and details which were not copied into this note but were reviewed prior to creation of Plan. LAB DATA REVIEWED:   
Recent Results (from the past 24 hour(s)) EKG, 12 LEAD, INITIAL Collection Time: 01/01/19 10:37 AM  
Result Value Ref Range  Ventricular Rate 74 BPM  
 Atrial Rate 74 BPM  
 P-R Interval 158 ms QRS Duration 86 ms  
 Q-T Interval 404 ms QTC Calculation (Bezet) 448 ms Calculated P Axis 40 degrees Calculated R Axis 21 degrees Calculated T Axis 17 degrees Diagnosis Normal sinus rhythm Possible Left atrial enlargement Nonspecific T wave abnormality When compared with ECG of 02-DEC-2012 11:43, Nonspecific T wave abnormality now evident in Anterior leads CBC WITH AUTOMATED DIFF Collection Time: 01/01/19 10:51 AM  
Result Value Ref Range WBC 10.8 3.6 - 11.0 K/uL  
 RBC 4.79 3.80 - 5.20 M/uL  
 HGB 13.1 11.5 - 16.0 g/dL HCT 39.5 35.0 - 47.0 % MCV 82.5 80.0 - 99.0 FL  
 MCH 27.3 26.0 - 34.0 PG  
 MCHC 33.2 30.0 - 36.5 g/dL  
 RDW 17.2 (H) 11.5 - 14.5 % PLATELET 383 278 - 315 K/uL MPV 10.7 8.9 - 12.9 FL  
 NRBC 0.0 0  WBC ABSOLUTE NRBC 0.00 0.00 - 0.01 K/uL NEUTROPHILS 60 32 - 75 % LYMPHOCYTES 30 12 - 49 % MONOCYTES 7 5 - 13 % EOSINOPHILS 2 0 - 7 % BASOPHILS 0 0 - 1 % IMMATURE GRANULOCYTES 1 (H) 0.0 - 0.5 % ABS. NEUTROPHILS 6.6 1.8 - 8.0 K/UL  
 ABS. LYMPHOCYTES 3.3 0.8 - 3.5 K/UL  
 ABS. MONOCYTES 0.7 0.0 - 1.0 K/UL  
 ABS. EOSINOPHILS 0.2 0.0 - 0.4 K/UL  
 ABS. BASOPHILS 0.0 0.0 - 0.1 K/UL  
 ABS. IMM. GRANS. 0.1 (H) 0.00 - 0.04 K/UL  
 DF AUTOMATED METABOLIC PANEL, BASIC Collection Time: 01/01/19 10:51 AM  
Result Value Ref Range Sodium 140 136 - 145 mmol/L Potassium 2.6 (LL) 3.5 - 5.1 mmol/L Chloride 103 97 - 108 mmol/L  
 CO2 28 21 - 32 mmol/L Anion gap 9 5 - 15 mmol/L Glucose 96 65 - 100 mg/dL BUN 17 6 - 20 MG/DL Creatinine 0.72 0.55 - 1.02 MG/DL  
 BUN/Creatinine ratio 24 (H) 12 - 20 GFR est AA >60 >60 ml/min/1.73m2 GFR est non-AA >60 >60 ml/min/1.73m2 Calcium 8.6 8.5 - 10.1 MG/DL PROTHROMBIN TIME + INR Collection Time: 01/01/19 10:51 AM  
Result Value Ref Range INR 1.0 0.9 - 1.1 Prothrombin time 9.8 9.0 - 11.1 sec HEPATIC FUNCTION PANEL Collection Time: 01/01/19 10:51 AM  
Result Value Ref Range Protein, total 7.7 6.4 - 8.2 g/dL Albumin 3.5 3.5 - 5.0 g/dL Globulin 4.2 (H) 2.0 - 4.0 g/dL A-G Ratio 0.8 (L) 1.1 - 2.2 Bilirubin, total 0.4 0.2 - 1.0 MG/DL Bilirubin, direct 0.1 0.0 - 0.2 MG/DL Alk. phosphatase 119 (H) 45 - 117 U/L  
 AST (SGOT) 13 (L) 15 - 37 U/L  
 ALT (SGPT) 25 12 - 78 U/L  
GLUCOSE, POC Collection Time: 01/01/19 11:21 AM  
Result Value Ref Range Glucose (POC) 100 65 - 100 mg/dL Performed by Ayleen Mcdermott (SON) URINALYSIS W/ REFLEX CULTURE Collection Time: 01/01/19 11:38 AM  
Result Value Ref Range Color YELLOW/STRAW Appearance CLEAR CLEAR Specific gravity 1.005 1.003 - 1.030    
 pH (UA) 7.0 5.0 - 8.0 Protein NEGATIVE  NEG mg/dL Glucose NEGATIVE  NEG mg/dL Ketone NEGATIVE  NEG mg/dL Bilirubin NEGATIVE  NEG Blood NEGATIVE  NEG Urobilinogen 1.0 0.2 - 1.0 EU/dL Nitrites NEGATIVE  NEG Leukocyte Esterase NEGATIVE  NEG    
 UA:UC IF INDICATED URINE CULTURE ORDERED (A) CNI    
 WBC 0-4 0 - 4 /hpf  
 RBC 0-5 0 - 5 /hpf Epithelial cells FEW FEW /lpf Bacteria 1+ (A) NEG /hpf Hyaline cast 0-2 0 - 5 /lpf

## 2019-01-02 ENCOUNTER — APPOINTMENT (OUTPATIENT)
Dept: MRI IMAGING | Age: 49
End: 2019-01-02
Attending: INTERNAL MEDICINE
Payer: COMMERCIAL

## 2019-01-02 LAB
ANION GAP SERPL CALC-SCNC: 9 MMOL/L (ref 5–15)
ATRIAL RATE: 74 BPM
BUN SERPL-MCNC: 11 MG/DL (ref 6–20)
BUN/CREAT SERPL: 16 (ref 12–20)
CALCIUM SERPL-MCNC: 7.9 MG/DL (ref 8.5–10.1)
CALCULATED P AXIS, ECG09: 40 DEGREES
CALCULATED R AXIS, ECG10: 21 DEGREES
CALCULATED T AXIS, ECG11: 17 DEGREES
CHLORIDE SERPL-SCNC: 106 MMOL/L (ref 97–108)
CHOLEST SERPL-MCNC: 205 MG/DL
CO2 SERPL-SCNC: 25 MMOL/L (ref 21–32)
CREAT SERPL-MCNC: 0.69 MG/DL (ref 0.55–1.02)
DIAGNOSIS, 93000: NORMAL
ERYTHROCYTE [DISTWIDTH] IN BLOOD BY AUTOMATED COUNT: 17.3 % (ref 11.5–14.5)
EST. AVERAGE GLUCOSE BLD GHB EST-MCNC: 128 MG/DL
GLUCOSE SERPL-MCNC: 95 MG/DL (ref 65–100)
HBA1C MFR BLD: 6.1 % (ref 4.2–6.3)
HCT VFR BLD AUTO: 37.3 % (ref 35–47)
HDLC SERPL-MCNC: 49 MG/DL
HDLC SERPL: 4.2 {RATIO} (ref 0–5)
HGB BLD-MCNC: 11.9 G/DL (ref 11.5–16)
LDLC SERPL CALC-MCNC: 131.4 MG/DL (ref 0–100)
LIPID PROFILE,FLP: ABNORMAL
MAGNESIUM SERPL-MCNC: 2.4 MG/DL (ref 1.6–2.4)
MCH RBC QN AUTO: 26.9 PG (ref 26–34)
MCHC RBC AUTO-ENTMCNC: 31.9 G/DL (ref 30–36.5)
MCV RBC AUTO: 84.4 FL (ref 80–99)
NRBC # BLD: 0 K/UL (ref 0–0.01)
NRBC BLD-RTO: 0 PER 100 WBC
P-R INTERVAL, ECG05: 158 MS
PHOSPHATE SERPL-MCNC: 3.5 MG/DL (ref 2.6–4.7)
PLATELET # BLD AUTO: 230 K/UL (ref 150–400)
PMV BLD AUTO: 10.8 FL (ref 8.9–12.9)
POTASSIUM SERPL-SCNC: 2.8 MMOL/L (ref 3.5–5.1)
Q-T INTERVAL, ECG07: 404 MS
QRS DURATION, ECG06: 86 MS
QTC CALCULATION (BEZET), ECG08: 448 MS
RBC # BLD AUTO: 4.42 M/UL (ref 3.8–5.2)
SODIUM SERPL-SCNC: 140 MMOL/L (ref 136–145)
TRIGL SERPL-MCNC: 123 MG/DL (ref ?–150)
TSH SERPL DL<=0.05 MIU/L-ACNC: 1.92 UIU/ML (ref 0.36–3.74)
VENTRICULAR RATE, ECG03: 74 BPM
VLDLC SERPL CALC-MCNC: 24.6 MG/DL
WBC # BLD AUTO: 7.8 K/UL (ref 3.6–11)

## 2019-01-02 PROCEDURE — 84100 ASSAY OF PHOSPHORUS: CPT

## 2019-01-02 PROCEDURE — 74011250637 HC RX REV CODE- 250/637: Performed by: INTERNAL MEDICINE

## 2019-01-02 PROCEDURE — 36415 COLL VENOUS BLD VENIPUNCTURE: CPT

## 2019-01-02 PROCEDURE — 70551 MRI BRAIN STEM W/O DYE: CPT

## 2019-01-02 PROCEDURE — 80061 LIPID PANEL: CPT

## 2019-01-02 PROCEDURE — 74011250637 HC RX REV CODE- 250/637: Performed by: HOSPITALIST

## 2019-01-02 PROCEDURE — 74011250636 HC RX REV CODE- 250/636

## 2019-01-02 PROCEDURE — 84443 ASSAY THYROID STIM HORMONE: CPT

## 2019-01-02 PROCEDURE — 96366 THER/PROPH/DIAG IV INF ADDON: CPT

## 2019-01-02 PROCEDURE — 80048 BASIC METABOLIC PNL TOTAL CA: CPT

## 2019-01-02 PROCEDURE — 74011250636 HC RX REV CODE- 250/636: Performed by: INTERNAL MEDICINE

## 2019-01-02 PROCEDURE — 65660000000 HC RM CCU STEPDOWN

## 2019-01-02 PROCEDURE — 85027 COMPLETE CBC AUTOMATED: CPT

## 2019-01-02 PROCEDURE — 99218 HC RM OBSERVATION: CPT

## 2019-01-02 PROCEDURE — 83036 HEMOGLOBIN GLYCOSYLATED A1C: CPT

## 2019-01-02 PROCEDURE — 74011250636 HC RX REV CODE- 250/636: Performed by: HOSPITALIST

## 2019-01-02 PROCEDURE — 83735 ASSAY OF MAGNESIUM: CPT

## 2019-01-02 PROCEDURE — 93306 TTE W/DOPPLER COMPLETE: CPT

## 2019-01-02 PROCEDURE — 94760 N-INVAS EAR/PLS OXIMETRY 1: CPT

## 2019-01-02 RX ORDER — LORAZEPAM 2 MG/ML
INJECTION INTRAMUSCULAR
Status: COMPLETED
Start: 2019-01-02 | End: 2019-01-02

## 2019-01-02 RX ORDER — METOPROLOL TARTRATE 50 MG/1
200 TABLET ORAL DAILY
Status: CANCELLED | OUTPATIENT
Start: 2019-01-03

## 2019-01-02 RX ORDER — POTASSIUM CHLORIDE 7.45 MG/ML
10 INJECTION INTRAVENOUS
Status: ACTIVE | OUTPATIENT
Start: 2019-01-02 | End: 2019-01-02

## 2019-01-02 RX ORDER — TOPIRAMATE 25 MG/1
25 TABLET ORAL 2 TIMES DAILY
Status: CANCELLED | OUTPATIENT
Start: 2019-01-02

## 2019-01-02 RX ORDER — FUROSEMIDE 40 MG/1
40 TABLET ORAL DAILY
Status: DISCONTINUED | OUTPATIENT
Start: 2019-01-03 | End: 2019-01-03 | Stop reason: HOSPADM

## 2019-01-02 RX ORDER — POTASSIUM CHLORIDE 7.45 MG/ML
10 INJECTION INTRAVENOUS
Status: DISCONTINUED | OUTPATIENT
Start: 2019-01-02 | End: 2019-01-02

## 2019-01-02 RX ORDER — LORAZEPAM 1 MG/1
1 TABLET ORAL
Status: ACTIVE | OUTPATIENT
Start: 2019-01-02 | End: 2019-01-03

## 2019-01-02 RX ORDER — POTASSIUM CHLORIDE 20 MEQ/1
40 TABLET, EXTENDED RELEASE ORAL
Status: COMPLETED | OUTPATIENT
Start: 2019-01-02 | End: 2019-01-02

## 2019-01-02 RX ADMIN — Medication 10 ML: at 06:06

## 2019-01-02 RX ADMIN — DIPHENHYDRAMINE HYDROCHLORIDE 25 MG: 25 CAPSULE ORAL at 23:30

## 2019-01-02 RX ADMIN — MECLIZINE 25 MG: 12.5 TABLET ORAL at 16:05

## 2019-01-02 RX ADMIN — POTASSIUM CHLORIDE 40 MEQ: 20 TABLET, EXTENDED RELEASE ORAL at 14:14

## 2019-01-02 RX ADMIN — Medication 10 ML: at 14:15

## 2019-01-02 RX ADMIN — POTASSIUM CHLORIDE 10 MEQ: 10 INJECTION, SOLUTION INTRAVENOUS at 07:00

## 2019-01-02 RX ADMIN — DIPHENHYDRAMINE HYDROCHLORIDE 25 MG: 25 CAPSULE ORAL at 00:05

## 2019-01-02 RX ADMIN — ASPIRIN 81 MG 81 MG: 81 TABLET ORAL at 08:57

## 2019-01-02 RX ADMIN — Medication 10 ML: at 21:45

## 2019-01-02 RX ADMIN — MECLIZINE 25 MG: 12.5 TABLET ORAL at 21:45

## 2019-01-02 RX ADMIN — ATORVASTATIN CALCIUM 40 MG: 40 TABLET, FILM COATED ORAL at 21:45

## 2019-01-02 RX ADMIN — Medication 10 ML: at 11:29

## 2019-01-02 RX ADMIN — LORAZEPAM 1 MG: 2 INJECTION INTRAMUSCULAR; INTRAVENOUS at 11:28

## 2019-01-02 RX ADMIN — MECLIZINE 25 MG: 12.5 TABLET ORAL at 08:57

## 2019-01-02 RX ADMIN — Medication 10 ML: at 03:34

## 2019-01-02 RX ADMIN — POTASSIUM CHLORIDE 10 MEQ: 10 INJECTION, SOLUTION INTRAVENOUS at 08:57

## 2019-01-02 NOTE — PROGRESS NOTES
* No surgery found * 
* No surgery found * Bedside and Verbal shift change report given to Lupe Hadley R.N  (oncoming nurse) by Herson Wilburn R.N (offgoing nurse). Report included the following information ED Summary, MAR and Cardiac Rhythm nsr. 
  
Zone Phone:   [phone] 
  
  
Significant changes during shift:  K 2.8,KCL GIVEN 
  
  
  
Patient Information 
  
Sarah Beth Dunne 51 y.o. 
1/1/2019  9:57 AM by Nickolas Parry MD. Sarah Beth Dunne was admitted from Home 
  
Problem List 
  
    
Patient Active Problem List  
  Diagnosis Date Noted  TIA (transient ischemic attack) 01/01/2019  Morbid obesity ([address] 08/05/2013  
  
    
Past Medical History:  
Diagnosis Date  Essential hypertension 2010  Hypertension    
 MARIA M (obstructive sleep apnea)    
 Stroke [facility])    
 TIA (transient ischemic attack) 2011 2012  
  
  
  
Core Measures: 
  
CVA: Yes Yes CHF:No No 
PNA:No No 
  
Post Op Surgical (If Applicable):  
  
Number times ambulated in hallway past shift:  NONE Number of times OOB to chair past shift:   YES 
NG Tube: No 
Incentive Spirometer: No 
Drains: No   Volume Dressing Present:  No 
Flatus:  Not applicable 
  
Activity Status: 
  
OOB to Chair Yes Ambulated this shift No  
Bed Rest No 
  
Supplemental O2: (If Applicable) 
  
NC No 
NRB No 
Venti-mask No 
On  Liters/min 
  
  
LINES AND DRAINS: 
  
Central Line? No  
  
PICC LINE? No  
  
Urinary Catheter? No  
  
DVT prophylaxis: 
  
DVT prophylaxis Med- Yes DVT prophylaxis SCD or ROHITH- No  
  
Wounds: (If Applicable) 
  
Wounds- No 
  
Location  
  
Patient Safety: 
  
Falls Score Total Score: 1 Safety Level_______ Bed Alarm On? No 
Sitter?  No 
  
Plan for upcoming shift: NEURO CHECK 
  
  
  
Discharge Plan: No  
  
Active Consults: 
IP CONSULT TO NEUROLOGY

## 2019-01-02 NOTE — PROGRESS NOTES
Hospitalist Progress Note NAME: Milan Gudino :  1970 MRN:  [id number] Assessment / Plan: 
TIA/CVA -presenting symptoms: dizizness/blurry vision/rt fingers tingling 
-neuro checks per protocol 
elevated head of bed 
-CT head- Normal. 
-CTA No acute large vessel arterial occlusion or significant stenosis. -f/u with Echo and MRI head 
-, TSH 1.92, A1C 6.1 
-cont' meclizine prn, ASA/ statin  
-PT/OT consult Hypokalemia 
-K 2.9, will replete  
-repeat bmp Hypertension 
-resume med sin the AM 
  
Hyperlipidemia 
-cont' statin Tobacco use 
-declined nicotine patch 
-cessation counseled Morbid obesity  
Body mass index is 46.87 kg/m².  
Code Status: Full code  
Surrogate Decision Maker:  
  
DVT Prophylaxis: Lovenox  
GI Prophylaxis: not indicated  
Baseline: ambulatory   
 
Subjective: Chief Complaint / Reason for Physician Visit Pt seen at bedside. C/o burning from IV site. Discussed with RN events overnight. Review of Systems: 
Symptom Y/N Comments  Symptom Y/N Comments Fever/Chills n   Chest Pain n   
Poor Appetite    Edema Cough    Abdominal Pain n   
Sputum    Joint Pain SOB/GANDHI n   Pruritis/Rash Nausea/vomit    Tolerating PT/OT Diarrhea    Tolerating Diet Constipation    Other Could NOT obtain due to:   
 
Objective: VITALS:  
Last 24hrs VS reviewed since prior progress note. Most recent are: 
Patient Vitals for the past 24 hrs: 
 Temp Pulse Resp BP SpO2  
19 1119 97.7 °F (36.5 °C) 85 18 149/80 100 % 19 0740 98.3 °F (36.8 °C) 73 18 138/78 99 % 19 0334 97.8 °F (36.6 °C) 78 18 136/74 100 % 19 2239 97.7 °F (36.5 °C) 89 20 122/66 100 % 19 1937 97.9 °F (36.6 °C) 84 18 133/73 100 % 19 1823 98.2 °F (36.8 °C) 81 18 138/77 96 % 19 1740 97.7 °F (36.5 °C) 87 25 132/81 95 % 19 1715  86 24 132/81 94 % 19 1630  87 22 138/77 94 % 01/01/19 1545  88 27 135/81 93 % 01/01/19 1450    (!) 158/93   
01/01/19 1400  85 12 138/81 96 % 01/01/19 1354  86 19 127/77 97 % Intake/Output Summary (Last 24 hours) at 1/2/2019 1203 Last data filed at 1/2/2019 1119 Gross per 24 hour Intake 2660 ml Output 700 ml Net 1960 ml PHYSICAL EXAM: 
General: WD, WN. Alert, cooperative, no acute distress   
EENT:  EOMI. Anicteric sclerae. MMM Resp:  CTA bilaterally, no wheezing or rales. No accessory muscle use CV:  Regular  rhythm,  No edema GI:  Soft, Non distended, Non tender.  +Bowel sounds Neurologic:  Alert and oriented X 3, normal speech, Psych:   Good insight. Not anxious nor agitated Skin:  No rashes. No jaundice Reviewed most current lab test results and cultures  YES Reviewed most current radiology test results   YES Review and summation of old records today    NO Reviewed patient's current orders and MAR    YES 
PMH/SH reviewed - no change compared to H&P 
________________________________________________________________________ Care Plan discussed with: 
  Comments Patient x Family RN x Care Manager Consultant Multidiciplinary team rounds were held today with , nursing, pharmacist and clinical coordinator. Patient's plan of care was discussed; medications were reviewed and discharge planning was addressed. ________________________________________________________________________ Total NON critical care TIME: 35   Minutes Total CRITICAL CARE TIME Spent:   Minutes non procedure based Comments >50% of visit spent in counseling and coordination of care    
________________________________________________________________________ Chuck Holly MD  
 
Procedures: see electronic medical records for all procedures/Xrays and details which were not copied into this note but were reviewed prior to creation of Plan.    
 
LABS: 
 I reviewed today's most current labs and imaging studies. Pertinent labs include: 
Recent Labs 01/02/19 
0338 01/01/19 
1051 WBC 7.8 10.8 HGB 11.9 13.1 HCT 37.3 39.5  263 Recent Labs 01/02/19 
0338 01/01/19 
1051  140  
K 2.8* 2.6*  
 103 CO2 25 28 GLU 95 96 BUN 11 17 CREA 0.69 0.72  
CA 7.9* 8.6 MG 2.4 2.5* PHOS 3.5  --   
ALB  --  3.5 TBILI  --  0.4 SGOT  --  13* ALT  --  25 INR  --  1.0 Signed: Maria R Gifford MD

## 2019-01-02 NOTE — PROGRESS NOTES
Problem: Falls - Risk of 
Goal: *Absence of Falls Document Barb End Fall Risk and appropriate interventions in the flowsheet. Outcome: Progressing Towards Goal 
Fall Risk Interventions: 
Mobility Interventions: Patient to call before getting OOB, Utilize walker, cane, or other assistive device

## 2019-01-02 NOTE — PROGRESS NOTES
* No surgery found * 
* No surgery found * Bedside and Verbal shift change report given to [facility] R.N  (oncoming nurse) by Rona Rosales (offgoing nurse). Report included the following information ED Summary, MAR and Cardiac Rhythm nsr. Zone Phone:   [phone] Significant changes during shift:  Admission, low potassium in AM labs=2.8, potassium replacement ordered. Patient Information Noemi Cardozo 51 y.o. 
1/1/2019  9:57 AM by Sosa Moore MD. Noemi Cardozo was admitted from Home 
 
Problem List 
 
Patient Active Problem List  
 Diagnosis Date Noted  TIA (transient ischemic attack) 01/01/2019  Morbid obesity ([address] 08/05/2013 Past Medical History:  
Diagnosis Date  Essential hypertension 2010  Hypertension  MARIA M (obstructive sleep apnea)  Stroke ([address]  TIA (transient ischemic attack) 2011 2012 Core Measures: CVA: Yes Yes CHF:No No 
PNA:No No 
 
Post Op Surgical (If Applicable):  
 
Number times ambulated in hallway past shift:  NONE Number of times OOB to chair past shift:   YES 
NG Tube: No 
Incentive Spirometer: No 
Drains: No   Volume Dressing Present:  No 
Flatus:  Not applicable Activity Status: OOB to Chair Yes Ambulated this shift No  
Bed Rest No 
 
Supplemental O2: (If Applicable) NC No 
NRB No 
Venti-mask No 
On  Liters/min LINES AND DRAINS: 
Central Line? No  
 
PICC LINE? No  
 
Urinary Catheter? No  
 
DVT prophylaxis: DVT prophylaxis Med- Yes DVT prophylaxis SCD or ROHITH- No  
 
Wounds: (If Applicable) Wounds- No 
 
Location Patient Safety: 
 
Falls Score Total Score: 1 Safety Level_______ Bed Alarm On? No 
Sitter? No 
 
Plan for upcoming shift: mri,echo, monitor potassium Discharge Plan: No  
 
Active Consults: 
IP CONSULT TO NEUROLOGY

## 2019-01-02 NOTE — PROGRESS NOTES
Speech pathology note Reviewed chart and note patient admitted with dizziness, blurry vision, and R tingling. MRI pending. Note patient passed the STAND and a regular diet was ordered. Discussed case with RN who reported no SLP related concerns. Introduced self and role of SLP to patient. Patient denied dysphagia, including no coughing or choking. Patient informally observed drinking successive straw sips of thin liquid with no difficulty. Patient also denied decline in motor speech, language, or cognitive function, and patient Ox4. Formal SLP evaluation not clinically indicated at this time. Will sign off. Please re-consult if further needs arise. Thank you. Ervin Dallas., CCC-SLP

## 2019-01-02 NOTE — PROGRESS NOTES
Occupational Therapy Orders received and medical record reviewed. Attempting to initiate OT Evaluation, however, transporter arrived to take pt off the floor for MRI.  (Had attempted to initiate OT Evalution earlier, however, pt was kylie for testing.)   Will defer and continue to follow. Addendum: Attempted to initiate OT Evaluation at 1438, however, pt reported drowsiness from her MRI medication and was eating her lunch. She requested not to participate at this time. Will continue to follow to initiate OT Evaluation.

## 2019-01-02 NOTE — PROGRESS NOTES
Orders received and medical record reviewed. Attempting to initiate PT Evaluation, however, transporter arrived to take pt off the floor for MRI.  (Had attempted to initiate PT Evalution earlier, however, pt was kylie for testing.)   Will defer and continue to follow.  
 
Angelita Scales, PT

## 2019-01-03 VITALS
OXYGEN SATURATION: 100 % | RESPIRATION RATE: 18 BRPM | BODY MASS INDEX: 47.12 KG/M2 | DIASTOLIC BLOOD PRESSURE: 78 MMHG | HEIGHT: 60 IN | SYSTOLIC BLOOD PRESSURE: 139 MMHG | WEIGHT: 240 LBS | TEMPERATURE: 98.2 F | HEART RATE: 80 BPM

## 2019-01-03 LAB
ANION GAP SERPL CALC-SCNC: 7 MMOL/L (ref 5–15)
BACTERIA SPEC CULT: NORMAL
BASOPHILS # BLD: 0 K/UL (ref 0–0.1)
BASOPHILS NFR BLD: 0 % (ref 0–1)
BUN SERPL-MCNC: 12 MG/DL (ref 6–20)
BUN/CREAT SERPL: 16 (ref 12–20)
CALCIUM SERPL-MCNC: 8.2 MG/DL (ref 8.5–10.1)
CC UR VC: NORMAL
CHLORIDE SERPL-SCNC: 106 MMOL/L (ref 97–108)
CO2 SERPL-SCNC: 28 MMOL/L (ref 21–32)
CREAT SERPL-MCNC: 0.75 MG/DL (ref 0.55–1.02)
DIFFERENTIAL METHOD BLD: ABNORMAL
EOSINOPHIL # BLD: 0.2 K/UL (ref 0–0.4)
EOSINOPHIL NFR BLD: 3 % (ref 0–7)
ERYTHROCYTE [DISTWIDTH] IN BLOOD BY AUTOMATED COUNT: 17.4 % (ref 11.5–14.5)
GLUCOSE SERPL-MCNC: 99 MG/DL (ref 65–100)
HCT VFR BLD AUTO: 37.2 % (ref 35–47)
HGB BLD-MCNC: 11.9 G/DL (ref 11.5–16)
IMM GRANULOCYTES # BLD: 0 K/UL (ref 0–0.04)
IMM GRANULOCYTES NFR BLD AUTO: 1 % (ref 0–0.5)
LYMPHOCYTES # BLD: 3 K/UL (ref 0.8–3.5)
LYMPHOCYTES NFR BLD: 35 % (ref 12–49)
MAGNESIUM SERPL-MCNC: 2.3 MG/DL (ref 1.6–2.4)
MCH RBC QN AUTO: 27 PG (ref 26–34)
MCHC RBC AUTO-ENTMCNC: 32 G/DL (ref 30–36.5)
MCV RBC AUTO: 84.5 FL (ref 80–99)
MONOCYTES # BLD: 0.6 K/UL (ref 0–1)
MONOCYTES NFR BLD: 7 % (ref 5–13)
NEUTS SEG # BLD: 4.6 K/UL (ref 1.8–8)
NEUTS SEG NFR BLD: 54 % (ref 32–75)
NRBC # BLD: 0 K/UL (ref 0–0.01)
NRBC BLD-RTO: 0 PER 100 WBC
PLATELET # BLD AUTO: 234 K/UL (ref 150–400)
PMV BLD AUTO: 11.1 FL (ref 8.9–12.9)
POTASSIUM SERPL-SCNC: 3.4 MMOL/L (ref 3.5–5.1)
RBC # BLD AUTO: 4.4 M/UL (ref 3.8–5.2)
SERVICE CMNT-IMP: NORMAL
SODIUM SERPL-SCNC: 141 MMOL/L (ref 136–145)
WBC # BLD AUTO: 8.5 K/UL (ref 3.6–11)

## 2019-01-03 PROCEDURE — 80048 BASIC METABOLIC PNL TOTAL CA: CPT

## 2019-01-03 PROCEDURE — 85025 COMPLETE CBC W/AUTO DIFF WBC: CPT

## 2019-01-03 PROCEDURE — 83735 ASSAY OF MAGNESIUM: CPT

## 2019-01-03 PROCEDURE — 74011250636 HC RX REV CODE- 250/636: Performed by: INTERNAL MEDICINE

## 2019-01-03 PROCEDURE — 74011250637 HC RX REV CODE- 250/637: Performed by: INTERNAL MEDICINE

## 2019-01-03 PROCEDURE — 90686 IIV4 VACC NO PRSV 0.5 ML IM: CPT | Performed by: INTERNAL MEDICINE

## 2019-01-03 PROCEDURE — 36415 COLL VENOUS BLD VENIPUNCTURE: CPT

## 2019-01-03 PROCEDURE — 99218 HC RM OBSERVATION: CPT

## 2019-01-03 PROCEDURE — 97161 PT EVAL LOW COMPLEX 20 MIN: CPT

## 2019-01-03 PROCEDURE — 90471 IMMUNIZATION ADMIN: CPT

## 2019-01-03 RX ORDER — GUAIFENESIN 100 MG/5ML
81 LIQUID (ML) ORAL DAILY
Qty: 30 TAB | Refills: 0 | Status: SHIPPED | OUTPATIENT
Start: 2019-01-04

## 2019-01-03 RX ORDER — GUAIFENESIN 100 MG/5ML
81 LIQUID (ML) ORAL DAILY
Qty: 30 TAB | Refills: 0 | Status: SHIPPED | OUTPATIENT
Start: 2019-01-04 | End: 2019-01-03

## 2019-01-03 RX ORDER — POTASSIUM CHLORIDE 1.5 G/1.77G
40 POWDER, FOR SOLUTION ORAL
Status: COMPLETED | OUTPATIENT
Start: 2019-01-03 | End: 2019-01-03

## 2019-01-03 RX ORDER — ATORVASTATIN CALCIUM 40 MG/1
40 TABLET, FILM COATED ORAL
Qty: 30 TAB | Refills: 0 | Status: SHIPPED | OUTPATIENT
Start: 2019-01-03 | End: 2019-01-03

## 2019-01-03 RX ORDER — ATORVASTATIN CALCIUM 40 MG/1
40 TABLET, FILM COATED ORAL
Qty: 30 TAB | Refills: 0 | Status: SHIPPED | OUTPATIENT
Start: 2019-01-03 | End: 2019-06-17

## 2019-01-03 RX ADMIN — POTASSIUM CHLORIDE 40 MEQ: 1.5 POWDER, FOR SOLUTION ORAL at 10:23

## 2019-01-03 RX ADMIN — MECLIZINE 25 MG: 12.5 TABLET ORAL at 09:13

## 2019-01-03 RX ADMIN — ASPIRIN 81 MG 81 MG: 81 TABLET ORAL at 09:13

## 2019-01-03 RX ADMIN — Medication 10 ML: at 03:46

## 2019-01-03 RX ADMIN — INFLUENZA VIRUS VACCINE 0.5 ML: 15; 15; 15; 15 SUSPENSION INTRAMUSCULAR at 10:23

## 2019-01-03 RX ADMIN — FUROSEMIDE 40 MG: 40 TABLET ORAL at 09:13

## 2019-01-03 NOTE — PROGRESS NOTES
Called Ms Tawnya Ellis and informed her that her medications are sent to [facility] as requested. Also notified pt to discontinuing crestor while on atorvastati

## 2019-01-03 NOTE — ROUTINE PROCESS
Bedside and Verbal shift change report given to Delano WRIGHT  (oncoming nurse) by Nina GROVES (offgoing nurse). Report included the following information ED Summary, MAR and Cardiac Rhythm nsr. 
  
Zone Phone:   [phone] 
  
  
Significant changes during shift:   none 
  
  
  
Patient Information 
  
Dane Venegas 51 y.o. 
1/1/2019  9:57 AM by Noreen Taylor MD. Laila Avila was admitted from Home 
  
Problem List 
  
       
Patient Active Problem List  
  Diagnosis Date Noted  TIA (transient ischemic attack) 01/01/2019  Morbid obesity ([address] 08/05/2013  
  
       
Past Medical History:  
Diagnosis Date  Essential hypertension 2010  Hypertension    
 MARIA M (obstructive sleep apnea)    
 Stroke [facility])    
 TIA (transient ischemic attack) 2011 2012  
  
  
  
Core Measures: 
  
CVA: Yes Yes CHF:No No 
PNA:No No 
  
Post Op Surgical (If Applicable):  
  
Number times ambulated in hallway past shift: Rony Romo Number of times OOB to chair past shift:   YES 
NG Tube: No 
Incentive Spirometer: No 
Drains: No   Volume   
Dressing Present:  No 
Flatus:  Not applicable 
  
Activity Status: 
  
OOB to Trov Ambulated this shift No  
Bed Rest No 
  
Supplemental M9: (QD Applicable) 
  
NC No 
NRB No 
Venti-mask No 
On  Liters/min 
  
  
LINES AND DRAINS: 
  
Central Line? No  
  
PICC LINE? No  
  
Urinary Catheter? No  
  
DVT prophylaxis: 
  
DVT prophylaxis Med- Yes DVT prophylaxis SCD or ROHITH- No  
  
Wounds: (If Applicable) 
  
Wounds- No 
  
Location  
  
Patient Safety: 
  
Falls Score Total Score: 1 Safety Level_______ Bed Alarm On? No 
Sitter? No 
  
Plan for upcoming shift: NEURO CHECK 
  
  
  
Discharge Plan: No  
  
Active Consults: 
IP CONSULT TO NEUROLOGY

## 2019-01-03 NOTE — PROGRESS NOTES
Physical Therapy Patient was quickly evaluated by PT just before leaving as requested by Dr. Braxton Allen.  Pt was literally ready to go out the door and the volunteer had been called to take the patient to her car. PT observed pt sit to stand, ambulate in hallway to elevator with volunteer following. There was no LOB, no unilateral weakness. Pt knew FACE  acronym and she stated that she had a followup appt with her primary MD and a neurologist.  Pt had no concerns, and there appeared to be no need for HHPT. Pt stated she was at her baseline. No furhter need for PT.

## 2019-01-03 NOTE — DISCHARGE SUMMARY
Discharge Summary      Name: Elmer Lopez  [id number]  YOB: 1970 (Age: 51 y.o.)   Date of Admission: 1/1/2019  Date of Discharge: 1/3/2019  Attending Physician: No att. providers found    Discharge Diagnosis:   TIA vs neuropathy  HTN  Hypokalemia  Tobacco use    Consultations:  IP CONSULT TO NEUROLOGY    Brief Admission History/Reason for Admission Per Harinder Sinha MD:   Elmer Lopez is a 51 y.o.   female with PMHx significant for HTN, H/O TIA, presents via EMS to the ED with cc of new onset HA x 2 hours upon waking with associated 2nd and 3rd fingers \"tingling\", light-headedness, and dizziness. hx of TIA with one in 2011 where she experienced vision problems in her R eye and another in 2012 where she experienced R sided weakness all resolved. Pt states that she did not experience vertigo sxs with prior TIAs. Pts  noted that she had to be helped into the bed due to difficulty standing. She states she went to bed at midnight last night with no sxs, and woke at 0600 with sxs. Pt denies any recent travel, head injuries, or roller-coaster use. Pt specifically denies any modifying factors. She specifically denies any nausea or vomiting. + smoker. Brief Hospital Course by Main Problems:   TIA vs neuropathy presenting symptoms: dizizness/blurry vision/rt fingers tingling. Denies pain in fingers. CT head- Normal.  CTA No acute large vessel arterial occlusion or significant stenosis. MRI head with no evidence of acute infarction. Echo with EF 55-60%. , TSH 1.92, A1C 6.1. Pt started on ASA and statin which she will need to cont' outpt. F/u with neurology in 2 weeks.     Hypokalemia, repleted.     Hypertension, resume med sin the AM     Hyperlipidemia, cont' statin     Tobacco use, declined nicotine patch. Cessation counseled       Discharge Exam:  Patient seen and examined by me on discharge day.   Pertinent Findings:  Visit Vitals  /78   Pulse 80   Temp 98.2 °F (36.8 °C)   Resp 18   Ht 5' (1.524 m)   Wt 108.9 kg (240 lb)   SpO2 100%   Breastfeeding? No   BMI 46.87 kg/m²     Gen:    Not in distress  Chest: Clear lungs  CVS:   Regular rhythm. No edema  Abd:  Soft, not distended, not tender    Discharge/Recent Laboratory Results:  Recent Labs     01/03/19  0327 01/02/19  0338    140   K 3.4* 2.8*    106   CO2 28 25   BUN 12 11   GLU 99 95   CA 8.2* 7.9*   PHOS  --  3.5   MG 2.3 2.4     Recent Labs     01/03/19  0327   HGB 11.9   HCT 37.2   WBC 8.5          Discharge Medications:  Discharge Medication List as of 1/3/2019 10:47 AM      START taking these medications    Details   aspirin 81 mg chewable tablet Take 1 Tab by mouth daily. , Normal, Disp-30 Tab, R-0      atorvastatin (LIPITOR) 40 mg tablet Take 1 Tab by mouth nightly., Normal, Disp-30 Tab, R-0         CONTINUE these medications which have NOT CHANGED    Details   amLODIPine 10 mg tab 10 mg, olmesartan 40 mg tab 40 mg Take 1 Dose by mouth daily. , Historical Med      topiramate (TOPAMAX) 25 mg tablet Take 25 mg by mouth two (2) times a day., Historical Med      butalbital-acetaminophen-caffeine (FIORICET) -40 mg per tablet Take 1-2 Tabs by mouth every four (4) hours as needed for Pain. Max Daily Amount: 12 Tabs., Print, Disp-20 Tab, R-0      metoprolol (LOPRESSOR) 100 mg tablet Take 200 mg by mouth daily. , Historical Med      furosemide (LASIX) 40 mg tablet Take  by mouth daily.   , Historical Med                    DISPOSITION:    Home with Family: x   Home with HH/PT/OT/RN:    SNF/LTC:    RAEANN:    OTHER:          Follow up with:   PCP : Vikram Hanley MD  Follow-up Information     Follow up With Specialties Details Why Radha Burt MD Internal Medicine On 1/17/2019 at 9:30 as previously scheduled [address]  [address]      Chayito Acosta MD Neurology  Office will call to schedule hospital follow up appointment [address] [facility]  [phone]            Code: Full  Diet: cardiac    Total time in minutes spent coordinating this discharge (includes going over instructions, follow-up, prescriptions, and preparing report for sign off to her PCP) :  35 minutes

## 2019-01-03 NOTE — PROGRESS NOTES
Discharge instructions/new prescriptions discussed with pt. All questions answered. Notified Dr. Troy Bermudez that pt wishes her prescriptions to be called into the [facility] [place]. IV and tele removed. Pt left via wheelchair with all personal belongings.

## 2019-01-03 NOTE — PROGRESS NOTES
Reason for Admission:   Patient came to hospital with complaint of headache with second and third fingers tingling along with lightheadedness and dizziness. She has hx tia, htn and stroke in past. Patient lives in one story home with her son. She states prior to coming to the hospital she was independent in adl's and iadl's. She works and drives. She denies using home oxygen. RRAT Score:       4 Plan for utilizing home health:      She has used home health services in the past when she had back surgery. She denies using snf services in the past.  
                 
Likelihood of Readmission:  Low Transition of Care Plan:     Patient plans to return home with family. She states her family will be transporting her home. Care Management Interventions PCP Verified by CM: Yes Transition of Care Consult (CM Consult): Discharge Planning Physical Therapy Consult: Yes Occupational Therapy Consult: Yes Speech Therapy Consult: Yes Current Support Network: Other(Her son lives with her. ) Confirm Follow Up Transport: Family Plan discussed with Pt/Family/Caregiver: Yes Discharge Location Discharge Placement: Home

## 2019-01-30 ENCOUNTER — OFFICE VISIT (OUTPATIENT)
Dept: NEUROLOGY | Age: 49
End: 2019-01-30

## 2019-01-30 VITALS
BODY MASS INDEX: 48.88 KG/M2 | SYSTOLIC BLOOD PRESSURE: 120 MMHG | WEIGHT: 249 LBS | HEIGHT: 60 IN | DIASTOLIC BLOOD PRESSURE: 82 MMHG

## 2019-01-30 DIAGNOSIS — G43.719 INTRACTABLE CHRONIC MIGRAINE WITHOUT AURA AND WITHOUT STATUS MIGRAINOSUS: ICD-10-CM

## 2019-01-30 DIAGNOSIS — E78.01 FAMILIAL HYPERCHOLESTEROLEMIA: ICD-10-CM

## 2019-01-30 DIAGNOSIS — M54.12 CERVICAL RADICULOPATHY: Primary | ICD-10-CM

## 2019-01-30 DIAGNOSIS — G45.9 TIA (TRANSIENT ISCHEMIC ATTACK): ICD-10-CM

## 2019-01-30 DIAGNOSIS — I10 ESSENTIAL HYPERTENSION: ICD-10-CM

## 2019-01-30 RX ORDER — TOPIRAMATE 50 MG/1
50 TABLET, FILM COATED ORAL 2 TIMES DAILY
Qty: 60 TAB | Refills: 3 | Status: SHIPPED | OUTPATIENT
Start: 2019-01-30 | End: 2019-06-17

## 2019-01-30 RX ORDER — TOPIRAMATE 25 MG/1
TABLET ORAL
Qty: 42 TAB | Refills: 0 | Status: SHIPPED | OUTPATIENT
Start: 2019-01-30 | End: 2019-02-20

## 2019-01-30 RX ORDER — ATENOLOL AND CHLORTHALIDONE TABLET 50; 25 MG/1; MG/1
1 TABLET ORAL DAILY
COMMUNITY

## 2019-01-30 RX ORDER — BUTALBITAL, ACETAMINOPHEN AND CAFFEINE 50; 325; 40 MG/1; MG/1; MG/1
TABLET ORAL
Qty: 20 TAB | Refills: 3 | Status: SHIPPED | OUTPATIENT
Start: 2019-01-30 | End: 2019-06-17

## 2019-01-30 NOTE — PROGRESS NOTES
NEUROLOGY HISTORY AND PHYSICALName Tahira Lyons Age 51 y.o. MRN [id number]  1970 PCP: Stacy Lira MD 
 
Chief Complaint: Thuan Nagel This is a 51 y.o. female who woke up new years with dizziness and poor balance and a headache. She needed assistance dressing. Her friend became concerned and called EMS. On New Years she went to [employer] and watched movies the rest of the night. Medical records including Discharge summary reviewed confirming above history MRI: No evidence for acute infarction Vascular studies: Pending A1C : 6.1 TSH: 1.92 
LDL: 131.4 Echocardiogram: EF 55 % to 60 %. No wall abnormalities Assessment and Plan 1. Cervical radiculopathy 
 
- EMG NCV MOTOR WITH F/WAVE PER NERVE; Future - MRI CERV SPINE W WO CONT; Future 2. Intractable chronic migraine without aura and without status migrainosus - topiramate (TOPAMAX) 25 mg tablet; Take 1 Tab by mouth daily for 7 days, THEN 1 Tab two (2) times a day for 7 days, THEN 1 Tab three (3) times daily for 7 days. Dispense: 42 Tab; Refill: 0 
- topiramate (TOPAMAX) 50 mg tablet; Take 1 Tab by mouth two (2) times a day. Dispense: 60 Tab; Refill: 3 
- butalbital-acetaminophen-caffeine (FIORICET, ESGIC) -40 mg per tablet; Take one to two on the onset of migraine once daily. Dispense: 20 Tab; Refill: 3 3. Hyperlipidemia Had hair thinining on atorvastatin Will prescribe crestor 4. Hypertension Continue tenoretic 5. Trans Ischemic attack Continue aspirin 6. Tobacco use Urged to quit. Patient Allergies Pcn [penicillins] and Vicodin [hydrocodone-acetaminophen] Current Outpatient Medications Medication Sig  
 atenolol-chlorthalidone (TENORETIC) 50-25 mg per tablet Take 1 Tab by mouth daily.  aspirin 81 mg chewable tablet Take 1 Tab by mouth daily.  butalbital-acetaminophen-caffeine (FIORICET) -40 mg per tablet Take 1-2 Tabs by mouth every four (4) hours as needed for Pain.  Max Daily Amount: 12 Tabs.  furosemide (LASIX) 40 mg tablet Take  by mouth daily.  atorvastatin (LIPITOR) 40 mg tablet Take 1 Tab by mouth nightly.  amLODIPine 10 mg tab 10 mg, olmesartan 40 mg tab 40 mg Take 1 Dose by mouth daily.  topiramate (TOPAMAX) 25 mg tablet Take 25 mg by mouth two (2) times a day.  metoprolol (LOPRESSOR) 100 mg tablet Take 200 mg by mouth daily. No current facility-administered medications for this visit. Past Medical History:  
Diagnosis Date  Constipation  Essential hypertension 2010  
 Headache  Hypertension  MARIA M (obstructive sleep apnea)  Stroke ([facility] [address]  TIA (transient ischemic attack) 2011 2012 Social History Tobacco Use  Smoking status: Current Every Day Smoker Packs/day: 1.50 Years: 20.00 Pack years: 30.00 Types: Cigarettes Substance Use Topics  Alcohol use: Yes Alcohol/week: 3.0 oz Types: 5 Glasses of wine per week Family History Problem Relation Age of Onset  Kidney Disease Mother  Stroke Paternal Uncle  Heart Disease Maternal Grandfather  Stroke Paternal Grandmother  Cancer Paternal Grandmother Review of Systems Constitutional: Negative for chills and fever. HENT: Negative for ear pain. Eyes: Positive for photophobia. Negative for pain and discharge. Respiratory: Negative for cough and hemoptysis. Cardiovascular: Negative for chest pain and claudication. Gastrointestinal: Negative for constipation and diarrhea. Genitourinary: Negative for flank pain and hematuria. Musculoskeletal: Positive for myalgias. Negative for back pain and neck pain. Skin: Negative for itching and rash. Neurological: Positive for sensory change and headaches. Endo/Heme/Allergies: Negative for environmental allergies. Does not bruise/bleed easily. Psychiatric/Behavioral: Negative for depression and hallucinations. Exam 
Visit Vitals /82 Ht 5' (1.524 m) Wt 249 lb (112.9 kg) BMI 48.63 kg/m² General: Well developed, well nourished. Patient in no apparent distress Head: Normocephalic, atraumatic, anicteric sclera Neck Normal ROM, No thyromegally Lungs:  Clear to auscultation bilaterally, No wheezes or rubs Cardiac: Regular rate and rhythm with no murmurs. Abd: Bowel sounds were audible. No tenderness on palpation Ext: No pedal edema Skin: Supple no rash NeurologicExam: 
Mental Status: Alert and oriented to person place and time Speech: Fluent no aphasia or dysarthria. Cranial Nerves:  II - XII Intact Undialated exam of the optic discs revealed sharp discs with no hemorrhage or exudate. Motor:  Full and symmetric strength of upper and lower proximal and distal muscles. Normal bulk and tone. Reflexes:   Deep tendon reflexes 2+/4 and symmetric. Sensory:   Symmetric and intact with no perceived deficits modalities involving small or large fibers. With the exception of sensory loss in the middle two fingers of the right hand Gait:  Gait is balanced and fluid with normal arm swing. Tremor:   No tremor noted. Cerebellar:  Coordination intact. Neurovascular: No carotid bruits. No JVD Imaging MRI Results (most recent): 
Results from Hospital Encounter encounter on 01/01/19 MRI BRAIN WO CONT Narrative INDICATION:  Vertigo and right-sided weakness COMPARISON:  CT/CTA head 1/1/2019 TECHNIQUE:  MR imaging of the brain was performed with sagittal T1, axial T1, 
T2, FLAIR, GRE, DWI/ADC;  
 
FINDINGS:   
 
The ventricles are midline without hydrocephalus. There is no acute intra or extra-axial fluid collection. Mild periventricular 
increased T2/FLAIR signal abnormality. No evidence for acute infarction. The major intracranial vascular flow-voids are patent. Air-fluid level in the left maxillary sinus Impression IMPRESSION: 
 
No evidence for acute infarction. Mild nonspecific periventricular signal abnormality. Small air-fluid level in the left maxillary sinus CT Results (most recent): 
Results from Hospital Encounter encounter on 01/01/19 CTA CODE NEURO HEAD AND NECK W CONT Narrative  REPORT* No large vessel occlusion. Preliminary report was provided by Dr. Avelnia Souza, the on-call radiologist, at 
11:46 Final report to follow. *END PRELIMINARY REPORT* CLINICAL HISTORY: Vertigo and right-sided weakness EXAMINATION:  CT ANGIOGRAPHY HEAD AND NECK COMPARISON: CT head 1/1/2019, 12/2/2012 TECHNIQUE:  Following the uneventful administration of iodinated contrast 
material, axial CT angiography of the head and neck was performed. Delayed axial 
images through the head were also obtained. Coronal and sagittal reconstructions 
were obtained. Manual postprocessing of images was performed. 3-D  Sagittal 
maximal intensity projection images were obtained. 3-D Coronal maximal 
intensity projections were obtained. CT dose reduction was achieved through use 
of a standardized protocol tailored for this examination and automatic exposure 
control for dose modulation. FINDINGS: 
 
Delayed contrast-enhanced head CT: The ventricles are midline without hydrocephalus. There is no acute intra or 
extra-axial hemorrhage. Gray-white matter differentiation is preserved. The 
basal cisterns are clear. The paranasal sinuses are clear. CTA NECK: 
 
Great vessels: Normal arch anatomy with the origins patent. Right subclavian artery: Patent Left subclavian artery: Patent Right common carotid artery: Patent Left common carotid artery: Patent Cervical right internal carotid artery: Patent with no significant stenosis by NASCET criteria. Cervical left internal carotid artery: Patent with no significant stenosis by NASCET criteria. Right vertebral artery: Patent Left vertebral artery: Patent Mild cervical spondylosis CTA HEAD: 
 
Right cavernous internal carotid artery: Patent Left cavernous internal carotid artery: Patent Anterior cerebral arteries: Patent Anterior communicating artery: Patent Right middle cerebral artery: Patent Left middle cerebral artery: Patent Posterior communicating arteries: Left is patent. Right is hypoplastic Posterior cerebral arteries: Patent Basilar artery: Patent Distal vertebral arteries: Patent Impression Impression: No acute large vessel arterial occlusion or significant stenosis. Lab Review Lab Results Component Value Date/Time WBC 8.5 01/03/2019 03:27 AM  
 HCT 37.2 01/03/2019 03:27 AM  
 HGB 11.9 01/03/2019 03:27 AM  
 PLATELET 842 21/19/1464 03:27 AM  
 
Lab Results Component Value Date/Time Sodium 141 01/03/2019 03:27 AM  
 Potassium 3.4 (L) 01/03/2019 03:27 AM  
 Chloride 106 01/03/2019 03:27 AM  
 CO2 28 01/03/2019 03:27 AM  
 Glucose 99 01/03/2019 03:27 AM  
 BUN 12 01/03/2019 03:27 AM  
 Creatinine 0.75 01/03/2019 03:27 AM  
 Calcium 8.2 (L) 01/03/2019 03:27 AM  
 
 
Lab Results Component Value Date/Time LDL, calculated 131.4 (H) 01/02/2019 03:38 AM  
 
Lab Results Component Value Date/Time  Hemoglobin A1c 6.1 01/02/2019 03:38 AM

## 2019-01-30 NOTE — PATIENT INSTRUCTIONS
Office Policies Paperwork Any paperwork that needs to be completed has a 3 WEEK turnaround time. Phone calls/patient messages:· Please allow up to 24 hours for someone in the office to contact you about your call or message. Be mindful your provider may be out of the office or your message may require further review. We encourage you to use 3LM for your messages as this is a faster, more efficient way to communicate with our office Medication Refills: · Prescription medications require up to 48 business hours to process. We encourage you to use 3[facility] for your refills. · For controlled medications: Please allow up to 72 business hours to process. Certain medications may require you to  a written prescription at our office. · NO narcotic/controlled medications will be prescribed after 4pm Monday through Friday or on weekends A Healthy Lifestyle: Care Instructions Your Care Instructions A healthy lifestyle can help you feel good, stay at a healthy weight, and have plenty of energy for both work and play. A healthy lifestyle is something you can share with your whole family. A healthy lifestyle also can lower your risk for serious health problems, such as high blood pressure, heart disease, and diabetes. You can follow a few steps listed below to improve your health and the health of your family. Follow-up care is a key part of your treatment and safety. Be sure to make and go to all appointments, and call your doctor if you are having problems. It's also a good idea to know your test results and keep a list of the medicines you take. How can you care for yourself at home? · Do not eat too much sugar, fat, or fast foods. You can still have dessert and treats now and then. The goal is moderation. · Start small to improve your eating habits. Pay attention to portion sizes, drink less juice and soda pop, and eat more fruits and vegetables. ? Eat a healthy amount of food. A 3-ounce serving of meat, for example, is about the size of a deck of cards. Fill the rest of your plate with vegetables and whole grains. ? Limit the amount of soda and sports drinks you have every day. Drink more water when you are thirsty. ? Eat at least 5 servings of fruits and vegetables every day. It may seem like a lot, but it is not hard to reach this goal. A serving or helping is 1 piece of fruit, 1 cup of vegetables, or 2 cups of leafy, raw vegetables. Have an apple or some carrot sticks as an afternoon snack instead of a candy bar. Try to have fruits and/or vegetables at every meal. 
· Make exercise part of your daily routine. You may want to start with simple activities, such as walking, bicycling, or slow swimming. Try to be active 30 to 60 minutes every day. You do not need to do all 30 to 60 minutes all at once. For example, you can exercise 3 times a day for 10 or 20 minutes. Moderate exercise is safe for most people, but it is always a good idea to talk to your doctor before starting an exercise program. 
· Keep moving. Noemi Banner the lawn, work in the garden, or [employer]. Take the stairs instead of the elevator at work. · If you smoke, quit. People who smoke have an increased risk for heart attack, stroke, cancer, and other lung illnesses. Quitting is hard, but there are ways to boost your chance of quitting tobacco for good. ? Use nicotine gum, patches, or lozenges. ? Ask your doctor about stop-smoking programs and medicines. ? Keep trying. In addition to reducing your risk of diseases in the future, you will notice some benefits soon after you stop using tobacco. If you have shortness of breath or asthma symptoms, they will likely get better within a few weeks after you quit. · Limit how much alcohol you drink. Moderate amounts of alcohol (up to 2 drinks a day for men, 1 drink a day for women) are okay.  But drinking too much can lead to liver problems, high blood pressure, and other health problems. Family health If you have a family, there are many things you can do together to improve your health. · Eat meals together as a family as often as possible. · Eat healthy foods. This includes fruits, vegetables, lean meats and dairy, and whole grains. · Include your family in your fitness plan. Most people think of activities such as jogging or tennis as the way to fitness, but there are many ways you and your family can be more active. Anything that makes you breathe hard and gets your heart pumping is exercise. Here are some tips: 
? Walk to do errands or to take your child to school or the bus. 
? Go for a family bike ride after dinner instead of watching TV. Where can you learn more? Go to http://guido-nader.info/. Enter X739 in the search box to learn more about \"A Healthy Lifestyle: Care Instructions. \" Current as of: September 11, 2018 Content Version: 11.9 © 7000-9194 [employer]. Care instructions adapted under license by [employer] (which disclaims liability or warranty for this information). If you have questions about a medical condition or this instruction, always ask your healthcare professional. [address] any warranty or liability for your use of this information.

## 2019-02-06 ENCOUNTER — TELEPHONE (OUTPATIENT)
Dept: NEUROLOGY | Age: 49
End: 2019-02-06

## 2019-02-06 NOTE — TELEPHONE ENCOUNTER
Dr. Danny Cristina would you like to do a peer to peer for th is patient's MRI if so let me know and I can set it up for you

## 2019-02-06 NOTE — TELEPHONE ENCOUNTER
I discussed this with her. She will need to have the EMG or physical therapy first the MRI was a long shot.  She is scheduled for the emg

## 2019-02-06 NOTE — TELEPHONE ENCOUNTER
Patients MRI has been denied by the insr please call [phone] ref# [id number] for the Peer 2 Peer

## 2019-02-07 ENCOUNTER — TELEPHONE (OUTPATIENT)
Dept: NEUROLOGY | Age: 49
End: 2019-02-07

## 2019-02-07 NOTE — TELEPHONE ENCOUNTER
Spoke with José Gee from coordination of care advised the peer to peer is not going to be done and taking another route

## 2019-02-11 ENCOUNTER — OFFICE VISIT (OUTPATIENT)
Dept: NEUROLOGY | Age: 49
End: 2019-02-11

## 2019-02-11 VITALS — HEART RATE: 69 BPM | DIASTOLIC BLOOD PRESSURE: 88 MMHG | OXYGEN SATURATION: 91 % | SYSTOLIC BLOOD PRESSURE: 139 MMHG

## 2019-02-11 DIAGNOSIS — M54.12 CERVICAL RADICULOPATHY: ICD-10-CM

## 2019-02-11 DIAGNOSIS — M54.2 NECK PAIN: Primary | ICD-10-CM

## 2019-02-11 RX ORDER — METHYLPREDNISOLONE 4 MG/1
TABLET ORAL
Qty: 1 DOSE PACK | Refills: 0 | Status: SHIPPED | OUTPATIENT
Start: 2019-02-11 | End: 2019-06-17

## 2019-02-11 NOTE — PROCEDURES
[facility] at [facility]        Tel: [phone] ? Fax: [phone]    ELECTRODIAGNOSTIC REPORT      Test Date:  2019    Patient: Kymberly Colon : 1970 Physician: Anastacio Cardenas M.D.   ID#: [id number] SEX: Female Ref. Phys: Anastacio Cardenas M.D. Patient History / Exam:  BUE, Cervical radiculopathy    EMG & NCV Findings:  Evaluation of the left median motor and the right median motor nerves showed normal distal onset latency (L3.5, R4.5 ms), normal amplitude (L11.3, R10.6 mV), and normal conduction velocity (Elbow-Wrist, L53, R53 m/s). The right ulnar motor nerve showed normal distal onset latency (2.5 ms), normal amplitude (12.9 mV), normal conduction velocity (B Elbow-Wrist, 60 m/s), and normal conduction velocity (A Elbow-B Elbow, 67 m/s). The left median sensory, the right radial sensory, the left ulnar sensory, and the right ulnar sensory nerves showed normal distal peak latency (L3.2, R2.1, L2.6, R2.8 ms) and normal amplitude (L70.0, R34.7, L76.9, R15.5 µV). The right median sensory nerve showed prolonged distal peak latency (4.4 ms) and normal amplitude (27.8 µV). Left vs. Right side comparison data for the ulnar sensory nerve indicates abnormal L-R amplitude difference (79.8 %). All remaining left vs. right side differences were within normal limits. All F Wave latencies were within normal limits. Needle evaluation of the right pronator teres muscle showed moderately increased polyphasic potentials. The right mid cervical paraspinal muscle showed increased motor unit amplitude and moderately increased polyphasic potentials. All remaining muscles (as indicated in the following table) showed no evidence of electrical instability. Impression  Cervical radiculopathy at C6.  MRI imaging recommended.       ___________________________  Anastacio Cardenas M.D.    Nerve Conduction Studies  Anti Sensory Summary Table     Stim Site NR Peak (ms) Norm Peak (ms) P-T Amp (µV) Norm P-T Amp Site1 Site2 Dist (cm)   Left Median Anti Sensory (2nd Digit)  32.5°C   Wrist    3.2 <4 70.0 >13 Wrist 2nd Digit 14.0   Right Median Anti Sensory (2nd Digit)  32.8°C   Wrist    4.4 <4 27.8 >13 Wrist 2nd Digit 14.0   Right Radial Anti Sensory (Base 1st Digit)  33.1°C   Wrist    2.1 <2.8 34.7 >11 Wrist Base 1st Digit 10.0   Left Ulnar Anti Sensory (5th Digit)  32.5°C   Wrist    2.6 <4.0 76.9 >9 Wrist 5th Digit 14.0   Right Ulnar Anti Sensory (5th Digit)  32.8°C   Wrist    2.8 <4.0 15.5 >9 Wrist 5th Digit 14.0     Motor Summary Table     Stim Site NR Onset (ms) Norm Onset (ms) O-P Amp (mV) Norm O-P Amp P-T Amp (mV) Site1 Site2 Dist (cm) Scotty (m/s)   Left Median Motor (Abd Poll Brev)  32.6°C   Wrist    3.5 <4.5 11.3 >4.1 18.7 Wrist Abd Poll Brev 8.0    Elbow    7.3  11.1  18.0 Elbow Wrist 20.0 53   Right Median Motor (Abd Poll Brev)   Wrist    4.5 <4.5 10.6 >4.1 16.7 Wrist Abd Poll Brev 8.0    Elbow    8.5  10.4  16.0 Elbow Wrist 21.0 53   Right Ulnar Motor (Abd Dig Minimi)   Wrist    2.5 <3.1 12.9 >7.0 19.6 Wrist Abd Dig Minimi 8.0    B Elbow    5.5  12.9  19.9 B Elbow Wrist 18.0 60   A Elbow    7.0  11.5  18.1 A Elbow B Elbow 10.0 67     F Wave Studies     NR F-Lat (ms) Lat Norm (ms) L-R F-Lat (ms) L-R Lat Norm   Right Ulnar (Mrkrs) (Abd Dig Min)  33.6°C      27.09 <32  <2.5       EMG     Side Muscle Nerve Root Ins Act Fibs Psw Recrt Duration Amp Poly Comment   Right Abd Poll Brev Median C8-T1 Nml Nml Nml Nml Nml Nml Nml    Right ABD Dig Min Ulnar C8-T1 Nml Nml Nml Nml Nml Nml Nml    Right Oppens Policis Median H4-P6 Nml Nml Nml Nml Nml Nml Nml    Right 1stDorInt Ulnar C8-T1 Nml Nml Nml Nml Nml Nml Nml    Right PronatorTeres Median C6-7 Nml Nml Nml Nml Nml Nml 2+    Right Anconeus Radial C7-8 Nml Nml Nml Nml Nml Nml Nml    Right Biceps Musculocut C5-6 Nml Nml Nml Nml Nml Nml Nml    Right Triceps Radial C6-7-8 Nml Nml Nml Nml Nml Nml Nml    Right Mid Cerv Parasp Rami C5,6 Nml Nml Nml Nml Nml Incr 2+    Right Lower Cerv Parasp Rami C7,T1 Nml Nml Nml Nml Nml Nml Nml      Waveforms:

## 2019-02-19 ENCOUNTER — HOSPITAL ENCOUNTER (OUTPATIENT)
Dept: PHYSICAL THERAPY | Age: 49
Discharge: HOME | End: 2019-02-19
Payer: COMMERCIAL

## 2019-02-19 PROCEDURE — 97162 PT EVAL MOD COMPLEX 30 MIN: CPT

## 2019-02-19 PROCEDURE — 97110 THERAPEUTIC EXERCISES: CPT

## 2019-02-19 NOTE — PROGRESS NOTES
PT INITIAL EVALUATION NOTE 2-15 Patient Name: Dane Venegas Date:2019 : 1970 [x]  Patient  Verified Payor: Marina Blanchard / Plan: [address] HMO / Product Type: HMO /   
In time: 8:13 AM  Out time: 9:05 AM 
Total Treatment Time (min): 52 minutes Visit #: 1 Treatment Area: Cervicalgia [M54.2] SUBJECTIVE Pain Level (0-10 scale): 10/10 Any medication changes, allergies to medications, adverse drug reactions, diagnosis change, or new procedure performed?: [] No    [x] Yes (see summary sheet for update) Subjective: The Pt reports that she started to develop some posterior/right sided neck pain and numbness and pain in fingers on the R hand. She reports that her symptoms have gotten progressively worse over the last two months. She reports that she has numbness and pain in tips and pads of her 4 fingers (2-5), but fingers 3 and 4 are the most severe. She reports that when she is at work there is more pain in the finger tips and then when she is relaxing at home there is more numbness. She reports that the pain in the posterior R neck is more mild and feels more like a \"tension headache\". She a history of tension headaches and migraines (1-2x/year), but the tension headaches are happening more frequently. She reports that when she has the numbness her R hand feels weaker. She is R handed. Aggravating factors: unable to determine she reports that they just \"come on\". Relieving factors include: unable to determine. She is independent with all ADLs. 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 She works 5 days a week for 8hr/day. She is working full duties without restrictions, but notices that she has more pain in her hands a work. She is not sleeping well at night, but it is not because of her hands.  She sleeps on her L side. PMH: lumbar spinal fusion in October 2016, 2 TIAs in 2011 and 2012, HTN (controlled with medication), prediabetic. She is taking Aleve PRN for her pain. OBJECTIVE/EXAMINATION Posture: Moderate forward head and protracted shoulders bilaterally Other Observations:  N/A Neurological: Sensations: Decreased light touch sensation C6 and C7 on R Cervical AROM:     
 Flexion: 50 Extension: 31 Side Bending: Right: [facility] Left:WFL Rotation: Right: 42  Left: 45 Shoulder AROM:  Right  Left Flexion:  [facility] Abduction:  [facility] 
 ER:   [facility] IR:   [facility]  WFL UPPER QUARTER   MUSCLE STRENGTH 
KEY       R  L 
0 - No Contraction  Shoulder Flexion 4  4 
1 - Trace   Shoulder Abduction 4  4 
2 - Poor   Shoulder ER  4  4 
3 - Fair    Shoulder IR  4  4 
4 - Good   Elbow Flexion  4+  4+ 5 - Normal   Elbow Extension 4  4+ Wrist Flexion  4  4+ Wrist Extension 4  4+ Finger ABD/ADD 5  5 MiddleTrapezius 4-  4- Lower Trapezius 4-  4- 
 
Palpation: TTP and increased turgor in UTs, levator, scalenes, cervical paraspinals, and suboccipitals bilaterally (R>L) Joint Assessment: Decreased thoracic and cervical PA glides and cervical downslides; relief with manual cervical traction Special Tests:Cervical Compression: (-)   Manriquez's Sign: NT Spurling Test: (+) R    IR Lift Off: NT Tim-Lee: NT    ER Lag Sign: NT 
  Empty Can: NT    Speed's Test: NT Others: NT     Clonus: NT 
  Abduction test: (+) R 
 
 
13  min Therapeutic Exercise:  [x] See flow sheet :  
Rationale: increase ROM and increase strength to improve the patients ability to perform ADLs and work duties with less pain or discomfort. With 
 [x] TE 
 [] TA 
 [] neuro [x] other: Patient Education: [x] Review HEP [] Progressed/Changed HEP based on:  
[] positioning   [] body mechanics   [] transfers   [] heat/ice application [x] other: Pt educated on diagnosis and prognosis with therapy Other Objective/Functional Measures: FOTO 57/100 Pain Level (0-10 scale) post treatment: 7/10 ASSESSMENT:  
  
[x]  See Plan of Care Alber Travis, PT 2/19/2019

## 2019-02-19 NOTE — PROGRESS NOTES
[facility] [address] ([facility] [address] Marisa George Phone: [phone] Fax: [phone] Plan of Care/Statement of Necessity for Physical Therapy Services  2-15 Patient name: Asia Romero  : 1970  Provider#: [id number] Referral source: Juanito Fritz MD     
Medical/Treatment Diagnosis: Cervicalgia [M54.2] Prior Hospitalization: see medical history Comorbidities: Allergies, back pain, BMI over 30, depression, headaches, HTN, prior surgery, sleep dysfunction, stroke or TIA Prior Level of Function: The patient completed 20 minutes of exercise seldom or never Medications: Verified on Patient Summary List 
 
Start of Care: 19      Onset Date: Acute, 2 mo The Plan of Care and following information is based on the information from the initial evaluation. Assessment/ key information: The Pt is a pleasant 51year old female who presents to therapy with R-sided C6-C7 cervical radiculopathy. Based on examination, the Pt presents to therapy with poor postural strength and stability, decreased cervical and thoracic spinal mobility, decreased scapular strength and stability, decreased core strength and stability, restrictions in her neck musculature, and decreased activity tolerance and endurance. The patient would benefit from skilled physical therapy to help improve the above listed impairments to allow the patient to safely return to their prior level of function with less overall pain or risk of further injury. The patient has a fair prognosis with skilled physical therapy.  
 
Evaluation Complexity History MEDIUM  Complexity : 1-2 comorbidities / personal factors will impact the outcome/ POC ; Examination HIGH Complexity : 4+ Standardized tests and measures addressing body structure, function, activity limitation and / or participation in recreation  ;Presentation MEDIUM Complexity : Evolving with changing characteristics ;Clinical Decision Making MEDIUM Complexity : FOTO score of 26-74 Overall Complexity Rating: MEDIUM Problem List: pain affecting function, decrease ROM, decrease strength, decrease ADL/ functional abilitiies, decrease activity tolerance and decrease flexibility/ joint mobility Treatment Plan may include any combination of the following: Therapeutic exercise, Therapeutic activities, Neuromuscular re-education, Physical agent/modality, Manual therapy, Patient education, Self Care training, Functional mobility training and Home safety training Patient / Family readiness to learn indicated by: asking questions and interest 
Persons(s) to be included in education: patient (P) and family support person (FSP);list friend Tobias Suggs Barriers to Learning/Limitations: None Patient Goal (s): it just go away Patient Self Reported Health Status: good Rehabilitation Potential: fair Short Term Goals: To be accomplished in 5 treatments: 
1. The Pt will be independent and compliant with their HEP. 2. The Pt will report a 50% reduction in their pain with ADLs. Long Term Goals: To be accomplished in 10 treatments: 
1. The Pt will score the MCII on her FOTO survey demonstrating improved overall function (57 to 58 points). 2. The Pt will be able to report 0-3/10 pain after working an 8hr shift to allow the Pt to be able to perform work duties with less pain or discomfort. Frequency / Duration: Patient to be seen 1 times per week for 10 treatments. Patient/ Caregiver education and instruction: self care, activity modification and exercises 
 
[x]  Plan of care has been reviewed with NAYELY Louise, PT 2/19/2019  
 
________________________________________________________________________ I certify that the above Therapy Services are being furnished while the patient is under my care. I agree with the treatment plan and certify that this therapy is necessary. [de-identified] Signature:____________________  Date:____________Time: _________

## 2019-03-07 ENCOUNTER — APPOINTMENT (OUTPATIENT)
Dept: PHYSICAL THERAPY | Age: 49
End: 2019-03-07
Payer: COMMERCIAL

## 2019-03-14 ENCOUNTER — HOSPITAL ENCOUNTER (OUTPATIENT)
Dept: PHYSICAL THERAPY | Age: 49
Discharge: HOME | End: 2019-03-14
Payer: COMMERCIAL

## 2019-03-14 PROCEDURE — 97110 THERAPEUTIC EXERCISES: CPT

## 2019-03-14 PROCEDURE — 97012 MECHANICAL TRACTION THERAPY: CPT

## 2019-03-14 NOTE — PROGRESS NOTES
PT DAILY TREATMENT NOTE 2-15    Patient Name: Selvin Parikh  Date:3/14/2019  : 1970  [x]  Patient  Verified  Payor: Sherry Carrillo / Plan: [address] HMO / Product Type: HMO /    In time: 7:38 AM  Out time: 8:25 AM  Total Treatment Time (min): 47 minutes  Visit #:  2    Treatment Area: Cervicalgia [M54.2]    SUBJECTIVE  Pain Level (0-10 scale): 5.5/10  Any medication changes, allergies to medications, adverse drug reactions, diagnosis change, or new procedure performed?: [x] No    [] Yes (see summary sheet for update)  Subjective functional status/changes:   [] No changes reported  The Pt reports that she has not been able to come to therapy for the last few weeks because she got a stomach virus and then the flu. She reports she has not been able to do her exercises as frequently as was recommended. She reports that she is having pain in her neck this morning along the midline of the spine. She continues to have numbness and tingling primarily in the 3-4 digits on the R that will wake her up almost every night. OBJECTIVE    Modality rationale: decrease pain and increase tissue extensibility to improve the patients ability to perform ADLs and work duties with less pain or discomfort.    Type Additional Details   [] Estim: []Att   []Unatt    []TENS instruct                  []IFC  []Premod   []NMES                     []Other:  []w/US   []w/ice   []w/heat  Position:  Location:   [x]  Traction: [x] Cervical       []Lumbar     (15 min)    [] Prone          [x]Supine                       [x]Intermittent   []Continuous Lbs: 15 max, 10 min  [] before manual  [] after manual  []w/heat   []  Ultrasound: []Continuous   [] Pulsed                       at: []1MHz   []3MHz Location:  W/cm2:   [] Paraffin         Location:   []w/heat   []  Ice     []  Heat  []  Ice massage Position:  Location:   []  Laser  []  Other: Position:  Location:   []  Vasopneumatic Device Pressure:       [] lo [] med [] hi Temperature:      [x] Skin assessment post-treatment:  [x]intact []redness- no adverse reaction    []redness  adverse reaction:         32 min Therapeutic Exercise:  [x] See flow sheet :   Rationale: increase ROM and increase strength to improve the patients ability to perform ADLs and work duties with less pain or discomfort. With   [x] TE   [] TA   [] neuro   [] other: Patient Education: [x] Review HEP    [] Progressed/Changed HEP based on:   [] positioning   [] body mechanics   [] transfers   [] heat/ice application    [] other:      Other Objective/Functional Measures: None noted     Pain Level (0-10 scale) post treatment: 0/10    ASSESSMENT/Changes in Function:   The Pt did require mild cuing to improve form and technique of her HEP and was able to tolerate the corrections easily. She tolerated the mechanical traction well with complete resolution of all symptoms after. Will progress as tolerated during next PT session. Patient will continue to benefit from skilled PT services to modify and progress therapeutic interventions, address functional mobility deficits, address ROM deficits, address strength deficits, analyze and address soft tissue restrictions, analyze and cue movement patterns, analyze and modify body mechanics/ergonomics, assess and modify postural abnormalities and instruct in home and community integration to attain remaining goals. []  See Plan of Care  []  See progress note/recertification  []  See Discharge Summary         Progress towards goals / Updated goals:  Short Term Goals: To be accomplished in 5 treatments:  1. The Pt will be independent and compliant with their HEP- progressing  2. The Pt will report a 50% reduction in their pain with ADLs- progressing  Long Term Goals: To be accomplished in 10 treatments:  1. The Pt will score the MCII on her FOTO survey demonstrating improved overall function (57 to 58 points)- progressing  2.  The Pt will be able to report 0-3/10 pain after working an 8hr shift to allow the Pt to be able to perform work duties with less pain or discomfort- progressing     PLAN  [x]  Upgrade activities as tolerated     [x]  Continue plan of care  [x]  Update interventions per flow sheet       []  Discharge due to:_  []  Other:_      Libia Arias, PT 3/14/2019

## 2019-03-27 ENCOUNTER — OFFICE VISIT (OUTPATIENT)
Dept: NEUROLOGY | Age: 49
End: 2019-03-27

## 2019-03-27 VITALS
WEIGHT: 253 LBS | OXYGEN SATURATION: 98 % | HEIGHT: 60 IN | SYSTOLIC BLOOD PRESSURE: 132 MMHG | BODY MASS INDEX: 49.67 KG/M2 | HEART RATE: 71 BPM | DIASTOLIC BLOOD PRESSURE: 82 MMHG

## 2019-03-27 DIAGNOSIS — I10 ESSENTIAL HYPERTENSION: ICD-10-CM

## 2019-03-27 DIAGNOSIS — M47.22 OSTEOARTHRITIS OF SPINE WITH RADICULOPATHY, CERVICAL REGION: Primary | ICD-10-CM

## 2019-03-27 DIAGNOSIS — E78.01 FAMILIAL HYPERCHOLESTEROLEMIA: ICD-10-CM

## 2019-03-27 DIAGNOSIS — M54.12 CERVICAL RADICULOPATHY: ICD-10-CM

## 2019-03-27 DIAGNOSIS — M54.2 NECK PAIN: ICD-10-CM

## 2019-03-27 RX ORDER — CYCLOBENZAPRINE HCL 10 MG
10 TABLET ORAL
Qty: 30 TAB | Refills: 3 | Status: SHIPPED | OUTPATIENT
Start: 2019-03-27 | End: 2020-09-16 | Stop reason: SDUPTHER

## 2019-03-27 RX ORDER — ALPRAZOLAM 1 MG/1
1 TABLET ORAL
Qty: 2 TAB | Refills: 0 | Status: SHIPPED | OUTPATIENT
Start: 2019-03-27 | End: 2019-06-17

## 2019-03-27 NOTE — PROGRESS NOTES
Name: Wilma Márquez    Chief Complaint: Neck pain    Continues to have neck pain running down the right arm. No improvement with pt. Discussed getting imaging but she is worried about the mri scanner. Assesment and Plan  1. Osteoarthritis of spine with radiculopathy, cervical region  Refractory to medications and physical therapy. - MRI CERV SPINE WO CONT; Future  - ALPRAZolam (XANAX) 1 mg tablet; Take 1 Tab by mouth nightly as needed for Anxiety (take prior to the procedure). Max Daily Amount: 1 mg. Dispense: 2 Tab; Refill: 0    2. Cervical radiculopathy  MRI of the C-spine    3. Neck pain  Trial of cyclobenzaprine    4. Essential hypertension  continue Tenoretic    5. Familial hypercholesterolemia  Continue Lipitor               Allergies  Pcn [penicillins] and Vicodin [hydrocodone-acetaminophen]     Medications  Current Outpatient Medications   Medication Sig    atenolol-chlorthalidone (TENORETIC) 50-25 mg per tablet Take 1 Tab by mouth daily.  topiramate (TOPAMAX) 50 mg tablet Take 1 Tab by mouth two (2) times a day.  aspirin 81 mg chewable tablet Take 1 Tab by mouth daily.  atorvastatin (LIPITOR) 40 mg tablet Take 1 Tab by mouth nightly.  topiramate (TOPAMAX) 25 mg tablet Take 25 mg by mouth two (2) times a day.  furosemide (LASIX) 40 mg tablet Take  by mouth daily.  methylPREDNISolone (MEDROL DOSEPACK) 4 mg tablet Take as direceted    butalbital-acetaminophen-caffeine (FIORICET, ESGIC) -40 mg per tablet Take one to two on the onset of migraine once daily.  butalbital-acetaminophen-caffeine (FIORICET) -40 mg per tablet Take 1-2 Tabs by mouth every four (4) hours as needed for Pain. Max Daily Amount: 12 Tabs. No current facility-administered medications for this visit.          Medical History  Past Medical History:   Diagnosis Date    Constipation     Essential hypertension 2010    Headache     Hypertension     MARIA M (obstructive sleep apnea)     Stroke [facility])     TIA (transient ischemic attack) 2011 2012     Review of Systems   Gastrointestinal: Negative for heartburn and nausea. Genitourinary: Negative for dysuria and urgency. Musculoskeletal: Positive for myalgias and neck pain. Neurological: Positive for sensory change and headaches. Endo/Heme/Allergies: Negative for environmental allergies. Does not bruise/bleed easily. Psychiatric/Behavioral: Negative for depression. The patient is nervous/anxious. Exam:  Visit Vitals  /82   Pulse 71   Ht 5' (1.524 m)   Wt 253 lb (114.8 kg)   SpO2 98%   BMI 49.41 kg/m²         General: Well developed, well nourished. Patient in no apparent distress   Head: Normocephalic, atraumatic, anicteric sclera   Neck Normal ROM, No thyromegally   Lungs:  Clear to auscultation bilaterally, No wheezes or rubs   Cardiac: Regular rate and rhythm with no murmurs. Abd: Bowel sounds were audible. No tenderness on palpation   Ext: No pedal edema   Skin: Supple no rash     NeurologicExam:  Mental Status: Alert and oriented to person place and time   Speech: Fluent no aphasia or dysarthria. Cranial Nerves:  II - XII Intact   Motor:  Full and symmetric strength of upper and lower proximal and distal muscles. Normal bulk and tone. Reflexes:    Decreased bicipital tendon reflex   Sensory:   Symmetric and intact with no perceived deficits modalities involving small or large fibers. Gait:  Gait is balanced and fluid with normal arm swing. Tremor:   No tremor noted. Cerebellar:  Coordination intact. Neurovascular: No carotid bruits. No JVD     Imaging  CT Results (most recent):  Results from Hospital Encounter encounter on 01/01/19   CTA CODE NEURO HEAD AND NECK W CONT    Narrative *PRELIMINARY REPORT*    No large vessel occlusion. Preliminary report was provided by Dr. Jessica Marie, the on-call radiologist, at  11:46    Final report to follow.     *END PRELIMINARY REPORT*    CLINICAL HISTORY: Vertigo and right-sided weakness    EXAMINATION:  CT ANGIOGRAPHY HEAD AND NECK     COMPARISON: CT head 1/1/2019, 12/2/2012    TECHNIQUE:  Following the uneventful administration of iodinated contrast  material, axial CT angiography of the head and neck was performed. Delayed axial  images through the head were also obtained. Coronal and sagittal reconstructions  were obtained. Manual postprocessing of images was performed. 3-D  Sagittal  maximal intensity projection images were obtained. 3-D Coronal maximal  intensity projections were obtained. CT dose reduction was achieved through use  of a standardized protocol tailored for this examination and automatic exposure  control for dose modulation. FINDINGS:    Delayed contrast-enhanced head CT:    The ventricles are midline without hydrocephalus. There is no acute intra or  extra-axial hemorrhage. Gray-white matter differentiation is preserved. The  basal cisterns are clear. The paranasal sinuses are clear. CTA NECK:    Great vessels: Normal arch anatomy with the origins patent. Right subclavian artery: Patent    Left subclavian artery: Patent    Right common carotid artery: Patent    Left common carotid artery: Patent    Cervical right internal carotid artery: Patent with no significant stenosis by  NASCET criteria. Cervical left internal carotid artery: Patent with no significant stenosis by  NASCET criteria. Right vertebral artery: Patent    Left vertebral artery: Patent    Mild cervical spondylosis    CTA HEAD:    Right cavernous internal carotid artery: Patent    Left cavernous internal carotid artery: Patent    Anterior cerebral arteries: Patent    Anterior communicating artery: Patent    Right middle cerebral artery: Patent    Left middle cerebral artery: Patent    Posterior communicating arteries: Left is patent.  Right is hypoplastic    Posterior cerebral arteries: Patent    Basilar artery: Patent    Distal vertebral arteries: Patent      Impression Impression:    No acute large vessel arterial occlusion or significant stenosis. MRI Results (most recent):  Results from [place] encounter on 01/01/19   MRI BRAIN WO CONT    Narrative INDICATION:  Vertigo and right-sided weakness     COMPARISON:  CT/CTA head 1/1/2019    TECHNIQUE:  MR imaging of the brain was performed with sagittal T1, axial T1,  T2, FLAIR, GRE, DWI/ADC;     FINDINGS:      The ventricles are midline without hydrocephalus. There is no acute intra or extra-axial fluid collection. Mild periventricular  increased T2/FLAIR signal abnormality. No evidence for acute infarction. The major intracranial vascular flow-voids are patent. Air-fluid level in the left maxillary sinus      Impression IMPRESSION:    No evidence for acute infarction. Mild nonspecific periventricular signal abnormality.     Small air-fluid level in the left maxillary sinus               Lab Review  Lab Results   Component Value Date/Time    WBC 8.5 01/03/2019 03:27 AM    HCT 37.2 01/03/2019 03:27 AM    HGB 11.9 01/03/2019 03:27 AM    PLATELET 026 49/20/0011 03:27 AM       Lab Results   Component Value Date/Time    Sodium 141 01/03/2019 03:27 AM    Potassium 3.4 (L) 01/03/2019 03:27 AM    Chloride 106 01/03/2019 03:27 AM    CO2 28 01/03/2019 03:27 AM    Glucose 99 01/03/2019 03:27 AM    BUN 12 01/03/2019 03:27 AM    Creatinine 0.75 01/03/2019 03:27 AM    Calcium 8.2 (L) 01/03/2019 03:27 AM         Lab Results   Component Value Date/Time    Hemoglobin A1c 6.1 01/02/2019 03:38 AM       Lab Results   Component Value Date/Time    Cholesterol, total 205 (H) 01/02/2019 03:38 AM    HDL Cholesterol 49 01/02/2019 03:38 AM    LDL, calculated 131.4 (H) 01/02/2019 03:38 AM    VLDL, calculated 24.6 01/02/2019 03:38 AM    Triglyceride 123 01/02/2019 03:38 AM    CHOL/HDL Ratio 4.2 01/02/2019 03:38 AM

## 2019-03-27 NOTE — PATIENT INSTRUCTIONS
A Healthy Lifestyle: Care Instructions  Your Care Instructions    A healthy lifestyle can help you feel good, stay at a healthy weight, and have plenty of energy for both work and play. A healthy lifestyle is something you can share with your whole family. A healthy lifestyle also can lower your risk for serious health problems, such as high blood pressure, heart disease, and diabetes. You can follow a few steps listed below to improve your health and the health of your family. Follow-up care is a key part of your treatment and safety. Be sure to make and go to all appointments, and call your doctor if you are having problems. It's also a good idea to know your test results and keep a list of the medicines you take. How can you care for yourself at home? · Do not eat too much sugar, fat, or fast foods. You can still have dessert and treats now and then. The goal is moderation. · Start small to improve your eating habits. Pay attention to portion sizes, drink less juice and soda pop, and eat more fruits and vegetables. ? Eat a healthy amount of food. A 3-ounce serving of meat, for example, is about the size of a deck of cards. Fill the rest of your plate with vegetables and whole grains. ? Limit the amount of soda and sports drinks you have every day. Drink more water when you are thirsty. ? Eat at least 5 servings of fruits and vegetables every day. It may seem like a lot, but it is not hard to reach this goal. A serving or helping is 1 piece of fruit, 1 cup of vegetables, or 2 cups of leafy, raw vegetables. Have an apple or some carrot sticks as an afternoon snack instead of a candy bar. Try to have fruits and/or vegetables at every meal.  · Make exercise part of your daily routine. You may want to start with simple activities, such as walking, bicycling, or slow swimming. Try to be active 30 to 60 minutes every day. You do not need to do all 30 to 60 minutes all at once.  For example, you can exercise 3 times a day for 10 or 20 minutes. Moderate exercise is safe for most people, but it is always a good idea to talk to your doctor before starting an exercise program.  · Keep moving. Aishwarya Bi the lawn, work in the garden, or Zel[place]. Take the stairs instead of the elevator at work. · If you smoke, quit. People who smoke have an increased risk for heart attack, stroke, cancer, and other lung illnesses. Quitting is hard, but there are ways to boost your chance of quitting tobacco for good. ? Use nicotine gum, patches, or lozenges. ? Ask your doctor about stop-smoking programs and medicines. ? Keep trying. In addition to reducing your risk of diseases in the future, you will notice some benefits soon after you stop using tobacco. If you have shortness of breath or asthma symptoms, they will likely get better within a few weeks after you quit. · Limit how much alcohol you drink. Moderate amounts of alcohol (up to 2 drinks a day for men, 1 drink a day for women) are okay. But drinking too much can lead to liver problems, high blood pressure, and other health problems. Family health  If you have a family, there are many things you can do together to improve your health. · Eat meals together as a family as often as possible. · Eat healthy foods. This includes fruits, vegetables, lean meats and dairy, and whole grains. · Include your family in your fitness plan. Most people think of activities such as jogging or tennis as the way to fitness, but there are many ways you and your family can be more active. Anything that makes you breathe hard and gets your heart pumping is exercise. Here are some tips:  ? Walk to do errands or to take your child to school or the bus.  ? Go for a family bike ride after dinner instead of watching TV. Where can you learn more? Go to http://guido-nader.info/. Enter Y984 in the search box to learn more about \"A Healthy Lifestyle: Care Instructions. \"  Current as of: September 11, 2018  Content Version: 11.9  © 3140-6430 [employer], [employer] Care instructions adapted under license by [employer] (which disclaims liability or warranty for this information). If you have questions about a medical condition or this instruction, always ask your healthcare professional. [address] any warranty or liability for your use of this information. Office Policies      Paperwork            Any paperwork that needs to be completed has a 3 WEEK turnaround time. Phone calls/patient messages:    · Please allow up to 24 hours for someone in the office to contact you about your call or message. Be mindful your provider may be out of the office or your message may require further review. We encourage you to use [employer] for your messages as this is a faster, more efficient way to communicate with our office           Medication Refills:    · Prescription medications require up to 48 business hours to process. We encourage you to use [employer] for your refills. · For controlled medications: Please allow up to 72 business hours to process. Certain medications may require you to  a written prescription at our office.   · NO narcotic/controlled medications will be prescribed after 4pm Monday through Friday or on weekends

## 2019-04-03 ENCOUNTER — TELEPHONE (OUTPATIENT)
Dept: NEUROLOGY | Age: 49
End: 2019-04-03

## 2019-04-03 NOTE — TELEPHONE ENCOUNTER
----- Message from Flower Frias sent at 4/3/2019  1:20 PM EDT -----  Regarding: Dr. Yamel Brewster., [facility] Number [phone] Authorization #[id number] is requesting an order for pt's MRI of Cervical Spine be faxed to [address] [phone].   Pt is scheduled on Friday, 04/05/19 at 2:15PM.

## 2019-04-05 ENCOUNTER — HOSPITAL ENCOUNTER (OUTPATIENT)
Dept: MRI IMAGING | Age: 49
Discharge: HOME | End: 2019-04-05
Attending: PSYCHIATRY & NEUROLOGY
Payer: COMMERCIAL

## 2019-04-05 DIAGNOSIS — M47.22 OSTEOARTHRITIS OF SPINE WITH RADICULOPATHY, CERVICAL REGION: ICD-10-CM

## 2019-04-05 PROCEDURE — 72141 MRI NECK SPINE W/O DYE: CPT

## 2019-04-12 ENCOUNTER — TELEPHONE (OUTPATIENT)
Dept: NEUROLOGY | Age: 49
End: 2019-04-12

## 2019-04-12 DIAGNOSIS — M54.2 NECK PAIN: Primary | ICD-10-CM

## 2019-04-12 NOTE — TELEPHONE ENCOUNTER
Result Notes for MRI CERV SPINE WO CONT     Notes recorded by Baldomero Ann MD on 4/9/2019 at 3:48 PM EDT  Bulging discs at c5-6 no surgery necessary     Spoke with the patient provided the above results. Patient stated after receiving the results what is she supposed to do about the tingling in her hands. Advised to the a patient will provide this information to Dr. Robin Diaz to provide a response.

## 2019-04-18 NOTE — ANCILLARY DISCHARGE INSTRUCTIONS
[facility] [address] [place], [address] Phone: [phone] Fax: [phone] Discharge Summary  2-15 Patient name: Elmer Lopez  : 1970  Provider#: [id number] Referral source: Faylene Sandifer, MD     
Medical/Treatment Diagnosis: Cervicalgia [M54.2] Prior Hospitalization: see medical history Comorbidities: See Plan of Care Prior Level of Function:See Plan of Care Medications: Verified on Patient Summary List 
 
Start of Care:  19      Onset Date: 2018 Visits from Start of Care: 2     Missed Visits: No show 2 Reporting Period : 19 to 3/14/19 ASSESSMENT/SUMMARY OF CARE: Pt is discharged today, 2019, as they have stopped attending therapy. Final objective data and outcomes were unable to be obtained. RECOMMENDATIONS: 
[x]Discontinue therapy: []Patient has reached or is progressing toward set goals [x]Patient is non-compliant or has abdicated 
    []Due to lack of appreciable progress towards set goals []Other Alexis Seymour, PT 2019

## 2019-06-17 ENCOUNTER — OFFICE VISIT (OUTPATIENT)
Dept: URGENT CARE | Age: 49
End: 2019-06-17

## 2019-06-17 VITALS
RESPIRATION RATE: 18 BRPM | HEART RATE: 89 BPM | DIASTOLIC BLOOD PRESSURE: 78 MMHG | TEMPERATURE: 98.5 F | WEIGHT: 257 LBS | BODY MASS INDEX: 50.45 KG/M2 | OXYGEN SATURATION: 97 % | SYSTOLIC BLOOD PRESSURE: 154 MMHG | HEIGHT: 60 IN

## 2019-06-17 DIAGNOSIS — M25.561 ACUTE PAIN OF RIGHT KNEE: Primary | ICD-10-CM

## 2019-06-17 RX ORDER — NAPROXEN 500 MG/1
500 TABLET ORAL
Qty: 30 TAB | Refills: 0 | Status: SHIPPED | OUTPATIENT
Start: 2019-06-17

## 2019-06-17 RX ORDER — BUPROPION HYDROCHLORIDE 300 MG/1
300 TABLET ORAL
COMMUNITY

## 2019-06-17 NOTE — PROGRESS NOTES
Phyllis Caceres is a 51 y.o. female who presents with right knee injury since last night, NKI. Pain worse with ambulation, bending the knee, Has taken ibuprofen without relief. Prior history of related problems: no prior problems with this area in the past.      The history is provided by the patient. Past Medical History:   Diagnosis Date    Constipation     Essential hypertension     Headache     Hypertension     MARIA M (obstructive sleep apnea)     Stroke ([address]     TIA (transient ischemic attack) 2011        Past Surgical History:   Procedure Laterality Date    HX  SECTION  1989    HX PARTIAL HYSTERECTOMY           Family History   Problem Relation Age of Onset    Kidney Disease Mother     Stroke Paternal Uncle     Heart Disease Maternal Grandfather     Stroke Paternal Grandmother     Cancer Paternal Grandmother         Social History     Socioeconomic History    Marital status: SINGLE     Spouse name: Not on file    Number of children: Not on file    Years of education: Not on file    Highest education level: Not on file   Occupational History    Occupation: nurse     Employer: OTHER     Comment: [id number] Financial resource strain: Not on file    Food insecurity:     Worry: Not on file     Inability: Not on file   [employer] needs:     Medical: Not on file     Non-medical: Not on file   Tobacco Use    Smoking status: Current Every Day Smoker     Packs/day: 1.50     Years: 20.00     Pack years: 30.00     Types: Cigarettes    Smokeless tobacco: Never Used   Substance and Sexual Activity    Alcohol use:  Yes     Alcohol/week: 3.0 oz     Types: 5 Glasses of wine per week    Drug use: No    Sexual activity: Yes     Partners: Male   Lifestyle    Physical activity:     Days per week: Not on file     Minutes per session: Not on file    Stress: Not on file   Relationships    Social connections:     Talks on phone: Not on file Gets together: Not on file     Attends Worship service: Not on file     Active member of club or organization: Not on file     Attends meetings of clubs or organizations: Not on file     Relationship status: Not on file    Intimate partner violence:     Fear of current or ex partner: Not on file     Emotionally abused: Not on file     Physically abused: Not on file     Forced sexual activity: Not on file   Other Topics Concern    Not on file   Social History Narrative    Works (3) 14 hour shifts per week at [employer]. In home with spouse. ALLERGIES: Pcn [penicillins] and Vicodin [hydrocodone-acetaminophen]    Review of Systems   Constitutional: Negative for chills and fever. Respiratory: Negative for shortness of breath and wheezing. Cardiovascular: Negative for chest pain and palpitations. Musculoskeletal: Positive for arthralgias and gait problem. Negative for myalgias. Skin: Negative for color change and rash. Neurological: Negative for weakness and numbness. Hematological: Negative for adenopathy. Vitals:    06/17/19 1711   BP: 154/78   Pulse: 89   Resp: 18   Temp: 98.5 °F (36.9 °C)   SpO2: 97%   Weight: 257 lb (116.6 kg)   Height: 5' (1.524 m)       Physical Exam   Constitutional: She appears well-developed and well-nourished. No distress. Musculoskeletal:        Right knee: She exhibits decreased range of motion and bony tenderness. She exhibits no swelling, no effusion, no ecchymosis, no deformity, no laceration, no erythema, normal alignment, no LCL laxity, normal patellar mobility, normal meniscus and no MCL laxity. Tenderness found. Medial joint line and lateral joint line tenderness noted. No MCL, no LCL and no patellar tendon tenderness noted. Neurological: She is alert. Skin: She is not diaphoretic. Psychiatric: She has a normal mood and affect.  Her behavior is normal. Judgment and thought content normal.   Nursing note and vitals reviewed. [facility]    ICD-10-CM ICD-9-CM    1. Acute pain of right knee M25.561 719.46 XR KNEE RT 3 V     Medications Ordered Today   Medications    naproxen (NAPROSYN) 500 mg tablet     Sig: Take 1 Tab by mouth every twelve (12) hours as needed for Pain. Dispense:  30 Tab     Refill:  0      The patient is to follow up with PCP/ortho. If signs and symptoms become worse the pt is to go to the ER. The patient is to take medications as prescribed. XR Results (most recent):  Results from Appointment encounter on 06/17/19   XR KNEE RT 3 V    Narrative EXAM: XR KNEE RT 3 V    INDICATION: Right knee pain without known injury. COMPARISON: None. FINDINGS: Three views of the right knee demonstrate mild to moderate  tricompartmental joint space narrowing with small marginal osteophytes. There is  a small knee effusion. There is normal bone mineralization. Impression IMPRESSION: Osteoarthrosis, mild-moderate.          Procedures

## 2019-06-17 NOTE — PATIENT INSTRUCTIONS
Knee Pain or Injury: Care Instructions  Your Care Instructions    Injuries are a common cause of knee problems. Sudden (acute) injuries may be caused by a direct blow to the knee. They can also be caused by abnormal twisting, bending, or falling on the knee. Pain, bruising, or swelling may be severe, and may start within minutes of the injury. Overuse is another cause of knee pain. Other causes are climbing stairs, kneeling, and other activities that use the knee. Everyday wear and tear, especially as you get older, also can cause knee pain. Rest, along with home treatment, often relieves pain and allows your knee to heal. If you have a serious knee injury, you may need tests and treatment. Follow-up care is a key part of your treatment and safety. Be sure to make and go to all appointments, and call your doctor if you are having problems. It's also a good idea to know your test results and keep a list of the medicines you take. How can you care for yourself at home? · Be safe with medicines. Read and follow all instructions on the label. ? If the doctor gave you a prescription medicine for pain, take it as prescribed. ? If you are not taking a prescription pain medicine, ask your doctor if you can take an over-the-counter medicine. · Rest and protect your knee. Take a break from any activity that may cause pain. · Put ice or a cold pack on your knee for 10 to 20 minutes at a time. Put a thin cloth between the ice and your skin. · Prop up a sore knee on a pillow when you ice it or anytime you sit or lie down for the next 3 days. Try to keep it above the level of your heart. This will help reduce swelling. · If your knee is not swollen, you can put moist heat, a heating pad, or a warm cloth on your knee. · If your doctor recommends an elastic bandage, sleeve, or other type of support for your knee, wear it as directed.   · Follow your doctor's instructions about how much weight you can put on your leg. Use a cane, crutches, or a walker as instructed. · Follow your doctor's instructions about activity during your healing process. If you can do mild exercise, slowly increase your activity. · Reach and stay at a healthy weight. Extra weight can strain the joints, especially the knees and hips, and make the pain worse. Losing even a few pounds may help. When should you call for help? Call 911 anytime you think you may need emergency care. For example, call if:    · You have symptoms of a blood clot in your lung (called a pulmonary embolism). These may include:  ? Sudden chest pain. ? Trouble breathing. ? Coughing up blood.    Call your doctor now or seek immediate medical care if:    · You have severe or increasing pain.     · Your leg or foot turns cold or changes color.     · You cannot stand or put weight on your knee.     · Your knee looks twisted or bent out of shape.     · You cannot move your knee.     · You have signs of infection, such as:  ? Increased pain, swelling, warmth, or redness. ? Red streaks leading from the knee. ? Pus draining from a place on your knee. ? A fever.     · You have signs of a blood clot in your leg (called a deep vein thrombosis), such as:  ? Pain in your calf, back of the knee, thigh, or groin. ? Redness and swelling in your leg or groin.    Watch closely for changes in your health, and be sure to contact your doctor if:    · You have tingling, weakness, or numbness in your knee.     · You have any new symptoms, such as swelling.     · You have bruises from a knee injury that last longer than 2 weeks.     · You do not get better as expected. Where can you learn more? Go to http://guido-nader.info/. Enter K195 in the search box to learn more about \"Knee Pain or Injury: Care Instructions. \"  Current as of: September 23, 2018  Content Version: 11.9  © 6618-6558 [employer].  Care instructions adapted under license by [employer] (which disclaims liability or warranty for this information). If you have questions about a medical condition or this instruction, always ask your healthcare professional. [address] any warranty or liability for your use of this information.

## 2019-06-17 NOTE — LETTER
[address] [facility] [address] 
[phone] Work/School Note Date: 6/17/2019 To Whom It May concern: 
 
Rudolph Randle was seen and treated today in the urgent care center. Rudolph Randle may return to work on 6/20/2019. Sincerely, Juliet Majano MD

## 2019-07-10 ENCOUNTER — OFFICE VISIT (OUTPATIENT)
Dept: NEUROLOGY | Age: 49
End: 2019-07-10

## 2019-07-10 VITALS — HEART RATE: 90 BPM | HEIGHT: 60 IN | WEIGHT: 261 LBS | BODY MASS INDEX: 51.24 KG/M2

## 2019-07-10 DIAGNOSIS — I10 ESSENTIAL HYPERTENSION: ICD-10-CM

## 2019-07-10 DIAGNOSIS — M54.2 NECK PAIN: ICD-10-CM

## 2019-07-10 DIAGNOSIS — M47.22 OSTEOARTHRITIS OF SPINE WITH RADICULOPATHY, CERVICAL REGION: Primary | ICD-10-CM

## 2019-07-10 NOTE — PATIENT INSTRUCTIONS

## 2019-07-10 NOTE — PROGRESS NOTES
Name: Kristen Hernández    Chief Complaint: Neck pain    Neck pain is improving. She is compliant with treatment. Assesment and Plan  1. Degenerative disease of the cervical spine  Improving  MRI only showed mild bulging with no foraminal stenosis    2. Neck pain  Continue cyclobenzaprine as needed    3. Essential hypertension  continue Tenoretic    4. Familial hypercholesterolemia  Continue Lipitor               Allergies  Pcn [penicillins] and Vicodin [hydrocodone-acetaminophen]     Medications  Current Outpatient Medications   Medication Sig    buPROPion XL (WELLBUTRIN XL) 300 mg XL tablet Take 300 mg by mouth every morning.  naproxen (NAPROSYN) 500 mg tablet Take 1 Tab by mouth every twelve (12) hours as needed for Pain.  atenolol-chlorthalidone (TENORETIC) 50-25 mg per tablet Take 1 Tab by mouth daily.  aspirin 81 mg chewable tablet Take 1 Tab by mouth daily.  butalbital-acetaminophen-caffeine (FIORICET) -40 mg per tablet Take 1-2 Tabs by mouth every four (4) hours as needed for Pain. Max Daily Amount: 12 Tabs.  furosemide (LASIX) 40 mg tablet Take  by mouth daily.  cyclobenzaprine (FLEXERIL) 10 mg tablet Take 1 Tab by mouth nightly. No current facility-administered medications for this visit. Medical History  Past Medical History:   Diagnosis Date    Constipation     Essential hypertension 2010    Headache     Hypertension     MARIA M (obstructive sleep apnea)     Stroke [facility])     TIA (transient ischemic attack) 2011 2012     Review of Systems   Gastrointestinal: Negative for heartburn and nausea. Genitourinary: Negative for dysuria and urgency. Musculoskeletal: Positive for myalgias and neck pain. Neurological: Positive for sensory change and headaches. Endo/Heme/Allergies: Negative for environmental allergies. Does not bruise/bleed easily. Psychiatric/Behavioral: Negative for depression. The patient is nervous/anxious.         Exam:  Visit Vitals  BP (P) 128/80   Ht 5' (1.524 m)   Wt 261 lb (118.4 kg)   SpO2 (P) 98%   BMI 50.97 kg/m²         General: Well developed, well nourished. Patient in no apparent distress   Head: Normocephalic, atraumatic, anicteric sclera   Neck Normal ROM, No thyromegally   Lungs:  Clear to auscultation bilaterally, No wheezes or rubs   Cardiac: Regular rate and rhythm with no murmurs. Abd: Bowel sounds were audible. No tenderness on palpation   Ext: No pedal edema   Skin: Supple no rash     NeurologicExam:  Mental Status: Alert and oriented to person place and time   Speech: Fluent no aphasia or dysarthria. Cranial Nerves:  II - XII Intact   Motor:  Full and symmetric strength of upper and lower proximal and distal muscles. Normal bulk and tone. Reflexes:    Decreased bicipital tendon reflex   Sensory:   Symmetric and intact with no perceived deficits modalities involving small or large fibers. Gait:  Gait is balanced and fluid with normal arm swing. Tremor:   No tremor noted. Cerebellar:  Coordination intact. Neurovascular: No carotid bruits. No JVD     Imaging  CT Results (most recent):  Results from Hospital Encounter encounter on 01/01/19   CTA CODE NEURO HEAD AND NECK W CONT    Narrative *PRELIMINARY REPORT*    No large vessel occlusion. Preliminary report was provided by Dr. Meeta Peres, the on-call radiologist, at  11:46    Final report to follow. *END PRELIMINARY REPORT*    CLINICAL HISTORY: Vertigo and right-sided weakness    EXAMINATION:  CT ANGIOGRAPHY HEAD AND NECK     COMPARISON: CT head 1/1/2019, 12/2/2012    TECHNIQUE:  Following the uneventful administration of iodinated contrast  material, axial CT angiography of the head and neck was performed. Delayed axial  images through the head were also obtained. Coronal and sagittal reconstructions  were obtained. Manual postprocessing of images was performed. 3-D  Sagittal  maximal intensity projection images were obtained.   3-D Coronal maximal  intensity projections were obtained. CT dose reduction was achieved through use  of a standardized protocol tailored for this examination and automatic exposure  control for dose modulation. FINDINGS:    Delayed contrast-enhanced head CT:    The ventricles are midline without hydrocephalus. There is no acute intra or  extra-axial hemorrhage. Gray-white matter differentiation is preserved. The  basal cisterns are clear. The paranasal sinuses are clear. CTA NECK:    Great vessels: Normal arch anatomy with the origins patent. Right subclavian artery: Patent    Left subclavian artery: Patent    Right common carotid artery: Patent    Left common carotid artery: Patent    Cervical right internal carotid artery: Patent with no significant stenosis by  NASCET criteria. Cervical left internal carotid artery: Patent with no significant stenosis by  NASCET criteria. Right vertebral artery: Patent    Left vertebral artery: Patent    Mild cervical spondylosis    CTA HEAD:    Right cavernous internal carotid artery: Patent    Left cavernous internal carotid artery: Patent    Anterior cerebral arteries: Patent    Anterior communicating artery: Patent    Right middle cerebral artery: Patent    Left middle cerebral artery: Patent    Posterior communicating arteries: Left is patent. Right is hypoplastic    Posterior cerebral arteries: Patent    Basilar artery: Patent    Distal vertebral arteries: Patent      Impression Impression:    No acute large vessel arterial occlusion or significant stenosis. MRI Results (most recent):  Results from [place] encounter on 04/05/19   MRI CERV SPINE WO CONT    Narrative EXAM: MRI CERV SPINE WO CONT    INDICATION: cervical radiculopathy. COMPARISON: None    TECHNIQUE: MR imaging of the cervical spine was performed using the following  sequences: sagittal T1, T2, STIR;  axial T2, T1.     CONTRAST:  None.     FINDINGS:    There is normal alignment of the cervical spine. Vertebral body heights are  maintained. Marrow signal is normal.    The craniocervical junction is intact. The course, caliber, and signal intensity  of the spinal cord are normal.      The paraspinal soft tissues are within normal limits. Within the visualized  portion of the brain, a partial empty sella is noted with mild enlargement of  the sella. C2-C3: No herniation or stenosis. C3-C4: No herniation or stenosis. C4-C5: No herniation or stenosis. C5-C6: No herniation or stenosis. Mild bulging disc. C6-C7: No herniation or stenosis. Mild bulging disc. C7-T1: No herniation or stenosis. Impression IMPRESSION:  No localizing disc herniation or spinal stenosis. No significant foraminal  narrowing. Mild bulging discs at C5-6 and C6-7.          Lab Review  Lab Results   Component Value Date/Time    WBC 8.5 01/03/2019 03:27 AM    HCT 37.2 01/03/2019 03:27 AM    HGB 11.9 01/03/2019 03:27 AM    PLATELET 601 42/00/6745 03:27 AM       Lab Results   Component Value Date/Time    Sodium 141 01/03/2019 03:27 AM    Potassium 3.4 (L) 01/03/2019 03:27 AM    Chloride 106 01/03/2019 03:27 AM    CO2 28 01/03/2019 03:27 AM    Glucose 99 01/03/2019 03:27 AM    BUN 12 01/03/2019 03:27 AM    Creatinine 0.75 01/03/2019 03:27 AM    Calcium 8.2 (L) 01/03/2019 03:27 AM         Lab Results   Component Value Date/Time    Hemoglobin A1c 6.1 01/02/2019 03:38 AM       Lab Results   Component Value Date/Time    Cholesterol, total 205 (H) 01/02/2019 03:38 AM    HDL Cholesterol 49 01/02/2019 03:38 AM    LDL, calculated 131.4 (H) 01/02/2019 03:38 AM    VLDL, calculated 24.6 01/02/2019 03:38 AM    Triglyceride 123 01/02/2019 03:38 AM    CHOL/HDL Ratio 4.2 01/02/2019 03:38 AM

## 2019-09-04 ENCOUNTER — APPOINTMENT (OUTPATIENT)
Dept: GENERAL RADIOLOGY | Age: 49
End: 2019-09-04
Attending: PHYSICIAN ASSISTANT
Payer: COMMERCIAL

## 2019-09-04 ENCOUNTER — HOSPITAL ENCOUNTER (EMERGENCY)
Age: 49
Discharge: HOME | End: 2019-09-04
Attending: EMERGENCY MEDICINE
Payer: COMMERCIAL

## 2019-09-04 VITALS
WEIGHT: 258.6 LBS | RESPIRATION RATE: 14 BRPM | SYSTOLIC BLOOD PRESSURE: 150 MMHG | DIASTOLIC BLOOD PRESSURE: 96 MMHG | OXYGEN SATURATION: 98 % | HEIGHT: 66 IN | TEMPERATURE: 98.3 F | BODY MASS INDEX: 41.56 KG/M2 | HEART RATE: 72 BPM

## 2019-09-04 DIAGNOSIS — M17.11 PRIMARY OSTEOARTHRITIS OF RIGHT KNEE: Primary | ICD-10-CM

## 2019-09-04 DIAGNOSIS — E66.01 MORBID OBESITY WITH BMI OF 40.0-44.9, ADULT (HCC): ICD-10-CM

## 2019-09-04 DIAGNOSIS — S86.911A KNEE STRAIN, RIGHT, INITIAL ENCOUNTER: ICD-10-CM

## 2019-09-04 PROCEDURE — 73562 X-RAY EXAM OF KNEE 3: CPT

## 2019-09-04 PROCEDURE — 99281 EMR DPT VST MAYX REQ PHY/QHP: CPT

## 2019-09-04 RX ORDER — MELOXICAM 15 MG/1
15 TABLET ORAL DAILY
Qty: 20 TAB | Refills: 0 | Status: SHIPPED | OUTPATIENT
Start: 2019-09-04

## 2019-09-04 NOTE — LETTER
[address] 
[facility] EMERGENCY DEPT 
[address] [place] [address] [place] [id number] [phone] Work/School Note Date: 9/4/2019 To Whom It May concern: 
 
Dottie Flores was seen and treated today in the emergency room by the following provider(s): 
Attending Provider: Mindy Suazo MD 
Physician Assistant: CATRACHITO Olvera. Dottie Flores may return to work on 09/07/2019. Sincerely, 
 
 
 
 
Essence Lujan

## 2019-09-05 NOTE — ED PROVIDER NOTES
EMERGENCY DEPARTMENT HISTORY AND PHYSICAL EXAM      Date: 9/4/2019  Patient Name: Gabriela Meade    Please note that this dictation was completed with Koemei, the computer voice recognition software. Quite often unanticipated grammatical, syntax, homophones, and other interpretive errors are inadvertently transcribed by the computer software. Please disregard these errors. Please excuse any errors that have escaped final proofreading. History of Presenting Illness     Chief Complaint   Patient presents with    Knee Pain       History Provided By: Patient    HPI: Gabriela Meade, 47 y.o. female with PMHx significant for HTN, TIA, morbid obesity, presents ambulatory to the ED with cc of R knee pain after dancing on her 49th birthday. Patient states that she has been ambulatory but this worsens her pain. She has been taking over-the-counter medication for pain with no lasting relief. She denies any previous history of any knee pain. PCP: Luke Knox MD    There are no other complaints, changes, or physical findings at this time. Current Outpatient Medications   Medication Sig Dispense Refill    meloxicam (MOBIC) 15 mg tablet Take 1 Tab by mouth daily. 20 Tab 0    buPROPion XL (WELLBUTRIN XL) 300 mg XL tablet Take 300 mg by mouth every morning.  naproxen (NAPROSYN) 500 mg tablet Take 1 Tab by mouth every twelve (12) hours as needed for Pain. 30 Tab 0    cyclobenzaprine (FLEXERIL) 10 mg tablet Take 1 Tab by mouth nightly. 30 Tab 3    atenolol-chlorthalidone (TENORETIC) 50-25 mg per tablet Take 1 Tab by mouth daily.  aspirin 81 mg chewable tablet Take 1 Tab by mouth daily. 30 Tab 0    butalbital-acetaminophen-caffeine (FIORICET) -40 mg per tablet Take 1-2 Tabs by mouth every four (4) hours as needed for Pain. Max Daily Amount: 12 Tabs. 20 Tab 0    furosemide (LASIX) 40 mg tablet Take  by mouth daily.            Past History     Past Medical History:  Past Medical History:   Diagnosis Date    Constipation     Essential hypertension     Headache     Hypertension     MARIA M (obstructive sleep apnea)     Stroke (HCC)     TIA (transient ischemic attack) 2011       Past Surgical History:  Past Surgical History:   Procedure Laterality Date    HX  SECTION  1989    HX PARTIAL HYSTERECTOMY         Family History:  Family History   Problem Relation Age of Onset    Kidney Disease Mother     Stroke Paternal Uncle     Heart Disease Maternal Grandfather     Stroke Paternal Grandmother     Cancer Paternal Grandmother        Social History:  Social History     Tobacco Use    Smoking status: Current Every Day Smoker     Packs/day: 1.50     Years: 20.00     Pack years: 30.00     Types: Cigarettes    Smokeless tobacco: Never Used   Substance Use Topics    Alcohol use: Yes     Alcohol/week: 5.0 standard drinks     Types: 5 Glasses of wine per week    Drug use: No       Allergies: Allergies   Allergen Reactions    Pcn [Penicillins] Anaphylaxis    Vicodin [Hydrocodone-Acetaminophen] Itching         Review of Systems   Review of Systems   Constitutional: Negative. Negative for activity change, appetite change, chills and fever. Eyes: Negative for pain and visual disturbance. Respiratory: Negative for cough and shortness of breath. Cardiovascular: Negative for chest pain and palpitations. Gastrointestinal: Negative for nausea and vomiting. Musculoskeletal: Positive for arthralgias. Negative for myalgias. Skin: Negative for color change, rash and wound. Neurological: Negative for dizziness, numbness and headaches. All other systems reviewed and are negative. Physical Exam   Physical Exam   Constitutional: She is oriented to person, place, and time. She appears well-developed and well-nourished. No distress. 47 y.o.   female in NAD  Communicates appropriately and in full sentences   HENT:   Head: Normocephalic and atraumatic. Eyes: Pupils are equal, round, and reactive to light. Conjunctivae are normal. Right eye exhibits no discharge. Left eye exhibits no discharge. Neck: Normal range of motion. Neck supple. No nuchal rigidity or meningeal signs   Cardiovascular: Intact distal pulses. Pulmonary/Chest: Effort normal. No respiratory distress. Musculoskeletal: She exhibits tenderness (Diffuse right knee tenderness without focality. No appreciable swelling. Ambulatory independently. Full active and passive range of motion even against resistance. ). She exhibits no edema or deformity. Neurological: She is alert and oriented to person, place, and time. Skin: Skin is warm and dry. No rash noted. She is not diaphoretic. No erythema. No pallor. Psychiatric: She has a normal mood and affect. Her behavior is normal.   Nursing note and vitals reviewed. Diagnostic Study Results     Labs -   No results found for this or any previous visit (from the past 12 hour(s)). Radiologic Studies -   XR KNEE RT 3 V   Final Result   IMPRESSION: Moderate osteoarthritis. Medical Decision Making   I am the first provider for this patient. I reviewed the vital signs, available nursing notes, past medical history, past surgical history, family history and social history. Vital Signs-Reviewed the patient's vital signs. Patient Vitals for the past 12 hrs:   Temp Pulse Resp BP SpO2   09/04/19 2037 98.3 °F (36.8 °C) 72 14 (!) 150/96 98 %         Records Reviewed: Nursing Notes and Old Medical Records    Provider Notes (Medical Decision Making):   DDx: Sprain, strain, spasm, contusion, fracture. Pt presents with knee pain with known injury. Plain films ordered and are negative. Will treat patient with analgesia and refer to orthopedics. Encouraged RICE and close follow-up. ED Course:   Initial assessment performed.  The patients presenting problems have been discussed, and they are in agreement with the care plan formulated and outlined with them. I have encouraged them to ask questions as they arise throughout their visit. DISCHARGE NOTE:  Madison Ralph  results have been reviewed with her. She has been counseled regarding her diagnosis. She verbally conveys understanding and agreement of the signs, symptoms, diagnosis, treatment and prognosis and additionally agrees to follow up as recommended with Dr. Dean Méndez MD in 24 - 48 hours. She also agrees with the care-plan and conveys that all of her questions have been answered. I have also put together some discharge instructions for her that include: 1) educational information regarding their diagnosis, 2) how to care for their diagnosis at home, as well a 3) list of reasons why they would want to return to the ED prior to their follow-up appointment, should their condition change. She and/or family's questions have been answered. I have encouraged them to see the official results in [facility]\" or to retrieve the specifics of their results from medical records. PLAN:  1. Return precautions as discussed  2. Follow-up with providers as directed  3. Medications as prescribed    Return to ED if worse     Diagnosis     Clinical Impression:   1. Primary osteoarthritis of right knee    2. Knee strain, right, initial encounter    3. Morbid obesity with BMI of 40.0-44.9, adult [facility])        Discharge Medication List as of 9/4/2019 10:29 PM      START taking these medications    Details   meloxicam (MOBIC) 15 mg tablet Take 1 Tab by mouth daily. , Normal, Disp-20 Tab, R-0         CONTINUE these medications which have NOT CHANGED    Details   buPROPion XL (WELLBUTRIN XL) 300 mg XL tablet Take 300 mg by mouth every morning., Historical Med      naproxen (NAPROSYN) 500 mg tablet Take 1 Tab by mouth every twelve (12) hours as needed for Pain., Normal, Disp-30 Tab, R-0      cyclobenzaprine (FLEXERIL) 10 mg tablet Take 1 Tab by mouth nightly., Normal, Disp-30 Tab, R-3      atenolol-chlorthalidone (TENORETIC) 50-25 mg per tablet Take 1 Tab by mouth daily. , Historical Med      aspirin 81 mg chewable tablet Take 1 Tab by mouth daily. , Normal, Disp-30 Tab, R-0      butalbital-acetaminophen-caffeine (FIORICET) -40 mg per tablet Take 1-2 Tabs by mouth every four (4) hours as needed for Pain. Max Daily Amount: 12 Tabs., Print, Disp-20 Tab, R-0      furosemide (LASIX) 40 mg tablet Take  by mouth daily. , Historical Med             Follow-up Information     Follow up With Specialties Details Why Gage Hampton MD Internal Medicine Call today  [address]  [phone]      Breana Vick MD Orthopedic Surgery Call If symptoms worsen, Possible further evaluation and treatment [address]  [phone]                This note will not be viewable in [address]

## 2019-09-05 NOTE — DISCHARGE INSTRUCTIONS
Patient Education        Arthritis: Care Instructions  Your Care Instructions  Arthritis, also called osteoarthritis, is a breakdown of the cartilage that cushions your joints. When the cartilage wears down, your bones rub against each other. This causes pain and stiffness. Many people have some arthritis as they age. Arthritis most often affects the joints of the spine, hands, hips, knees, or feet. You can take simple measures to protect your joints, ease your pain, and help you stay active. Follow-up care is a key part of your treatment and safety. Be sure to make and go to all appointments, and call your doctor if you are having problems. It's also a good idea to know your test results and keep a list of the medicines you take. How can you care for yourself at home? · Stay at a healthy weight. Being overweight puts extra strain on your joints. · Talk to your doctor or physical therapist about exercises that will help ease joint pain. ? Stretch. You may enjoy gentle forms of yoga to help keep your joints and muscles flexible. ? Walk instead of jog. Other types of exercise that are less stressful on the joints include riding a bicycle, swimming, devika chi, or water exercise. ? Lift weights. Strong muscles help reduce stress on your joints. Stronger thigh muscles, for example, take some of the stress off of the knees and hips. Learn the right way to lift weights so you do not make joint pain worse. · Take your medicines exactly as prescribed. Call your doctor if you think you are having a problem with your medicine. · Take pain medicines exactly as directed. ? If the doctor gave you a prescription medicine for pain, take it as prescribed. ? If you are not taking a prescription pain medicine, ask your doctor if you can take an over-the-counter medicine. · Use a cane, crutch, walker, or another device if you need help to get around. These can help rest your joints.  You also can use other things to make life easier, such as a higher toilet seat and padded handles on kitchen utensils. · Do not sit in low chairs, which can make it hard to get up. · Put heat or cold on your sore joints as needed. Use whichever helps you most. You also can take turns with hot and cold packs. ? Apply heat 2 or 3 times a day for 20 to 30 minutes--using a heating pad, hot shower, or hot pack--to relieve pain and stiffness. ? Put ice or a cold pack on your sore joint for 10 to 20 minutes at a time. Put a thin cloth between the ice and your skin. When should you call for help? Call your doctor now or seek immediate medical care if:    · You have sudden swelling, warmth, or pain in any joint.     · You have joint pain and a fever or rash.     · You have such bad pain that you cannot use a joint.    Watch closely for changes in your health, and be sure to contact your doctor if:    · You have mild joint symptoms that continue even with more than 6 weeks of care at home.     · You have stomach pain or other problems with your medicine. Where can you learn more? Go to http://guido-nader.info/. Enter Y330 in the search box to learn more about \"Arthritis: Care Instructions. \"  Current as of: April 1, 2019  Content Version: 12.1  © 7440-3165 [employer]. Care instructions adapted under license by [employer] (which disclaims liability or warranty for this information). If you have questions about a medical condition or this instruction, always ask your healthcare professional. [place] [address] any warranty or liability for your use of this information. Patient Education        Knee Arthritis: Care Instructions  Your Care Instructions    Knee arthritis is a breakdown of the cartilage that cushions your knee joint. When the cartilage wears down, your bones rub against each other. This causes pain and stiffness. Knee arthritis tends to get worse with time.   Treatment for knee arthritis involves reducing pain, making the leg muscles stronger, and staying at a healthy body weight. The treatment usually does not improve the health of the cartilage, but it can reduce pain and improve how well your knee works. You can take simple measures to protect your knee joints, ease your pain, and help you stay active. Follow-up care is a key part of your treatment and safety. Be sure to make and go to all appointments, and call your doctor if you are having problems. It's also a good idea to know your test results and keep a list of the medicines you take. How can you care for yourself at home? · Know that knee arthritis will cause more pain on some days than on others. · Stay at a healthy weight. Lose weight if you are overweight. When you stand up, the pressure on your knees from every pound of body weight is multiplied four times. So if you lose 10 pounds, you will reduce the pressure on your knees by 40 pounds. · Talk to your doctor or physical therapist about exercises that will help ease joint pain. ? Stretch to help prevent stiffness and to prevent injury before you exercise. You may enjoy gentle forms of yoga to help keep your knee joints and muscles flexible. ? Walk instead of jog.  ? Ride a bike. This makes your thigh muscles stronger and takes pressure off your knee. ? Wear well-fitting and comfortable shoes. ? Exercise in chest-deep water. This can help you exercise longer with less pain. ? Avoid exercises that include squatting or kneeling. They can put a lot of strain on your knees. ? Talk to your doctor to make sure that the exercise you do is not making the arthritis worse. · Do not sit for long periods of time. Try to walk once in a while to keep your knee from getting stiff. · Ask your doctor or physical therapist whether shoe inserts may reduce your arthritis pain. · If you can afford it, get new athletic shoes at least every year.  This can help reduce the strain on your knees. · Use a device to help you do everyday activities. ? A cane or walking stick can help you keep your balance when you walk. Hold the cane or walking stick in the hand opposite the painful knee. ? If you feel like you may fall when you walk, try using crutches or a front-wheeled walker. These can prevent falls that could cause more damage to your knee. ? A knee brace may help keep your knee stable and prevent pain. ? You also can use other things to make life easier, such as a higher toilet seat and handrails in the bathtub or shower. · Take pain medicines exactly as directed. ? Do not wait until you are in severe pain. You will get better results if you take it sooner. ? If you are not taking a prescription pain medicine, take an over-the-counter medicine such as acetaminophen (Tylenol), ibuprofen (Advil, Motrin), or naproxen (Aleve). Read and follow all instructions on the label. ? Do not take two or more pain medicines at the same time unless the doctor told you to. Many pain medicines have acetaminophen, which is Tylenol. Too much acetaminophen (Tylenol) can be harmful. ? Tell your doctor if you take a blood thinner, have diabetes, or have allergies to shellfish. · Ask your doctor if you might benefit from a shot of steroid medicine into your knee. This may provide pain relief for several months. · Many people take the supplements glucosamine and chondroitin for osteoarthritis. Some people feel they help, but the medical research does not show that they work. Talk to your doctor before you take these supplements. When should you call for help? Call your doctor now or seek immediate medical care if:    · You have sudden swelling, warmth, or pain in your knee.     · You have knee pain and a fever or rash.     · You have such bad pain that you cannot use your knee.    Watch closely for changes in your health, and be sure to contact your doctor if you have any problems.   Where can you learn more?  Go to http://guido-nader.info/. Enter Q523 in the search box to learn more about \"Knee Arthritis: Care Instructions. \"  Current as of: April 1, 2019  Content Version: 12.1  © 0988-9266 [employer]. Care instructions adapted under license by [employer] (which disclaims liability or warranty for this information). If you have questions about a medical condition or this instruction, always ask your healthcare professional. [address] any warranty or liability for your use of this information. Patient Education        Knee Arthritis: Exercises  Introduction  Here are some examples of exercises for you to try. The exercises may be suggested for a condition or for rehabilitation. Start each exercise slowly. Ease off the exercises if you start to have pain. You will be told when to start these exercises and which ones will work best for you. How to do the exercises  Knee flexion with heel slide    1. Lie on your back with your knees bent. 2. Slide your heel back by bending your affected knee as far as you can. Then hook your other foot around your ankle to help pull your heel even farther back. 3. Hold for about 6 seconds, then rest for up to 10 seconds. 4. Repeat 8 to 12 times. 5. Switch legs and repeat steps 1 through 4, even if only one knee is sore. Quad sets    1. Sit with your affected leg straight and supported on the floor or a firm bed. Place a small, rolled-up towel under your knee. Your other leg should be bent, with that foot flat on the floor. 2. Tighten the thigh muscles of your affected leg by pressing the back of your knee down into the towel. 3. Hold for about 6 seconds, then rest for up to 10 seconds. 4. Repeat 8 to 12 times. 5. Switch legs and repeat steps 1 through 4, even if only one knee is sore. Straight-leg raises to the front    1.  Lie on your back with your good knee bent so that your foot rests flat on the floor. Your affected leg should be straight. Make sure that your low back has a normal curve. You should be able to slip your hand in between the floor and the small of your back, with your palm touching the floor and your back touching the back of your hand. 2. Tighten the thigh muscles in your affected leg by pressing the back of your knee flat down to the floor. Hold your knee straight. 3. Keeping the thigh muscles tight and your leg straight, lift your affected leg up so that your heel is about 12 inches off the floor. Hold for about 6 seconds, then lower slowly. 4. Relax for up to 10 seconds between repetitions. 5. Repeat 8 to 12 times. 6. Switch legs and repeat steps 1 through 5, even if only one knee is sore. Active knee flexion    1. Lie on your stomach with your knees straight. If your kneecap is uncomfortable, roll up a washcloth and put it under your leg just above your kneecap. 2. Lift the foot of your affected leg by bending the knee so that you bring the foot up toward your buttock. If this motion hurts, try it without bending your knee quite as far. This may help you avoid any painful motion. 3. Slowly move your leg up and down. 4. Repeat 8 to 12 times. 5. Switch legs and repeat steps 1 through 4, even if only one knee is sore. Quadriceps stretch (facedown)    1. Lie flat on your stomach, and rest your face on the floor. 2. Wrap a towel or belt strap around the lower part of your affected leg. Then use the towel or belt strap to slowly pull your heel toward your buttock until you feel a stretch. 3. Hold for about 15 to 30 seconds, then relax your leg against the towel or belt strap. 4. Repeat 2 to 4 times. 5. Switch legs and repeat steps 1 through 4, even if only one knee is sore. Stationary exercise bike    1. If you do not have a stationary exercise bike at home, you can find one to ride at your local health club or community center.   2. Adjust the height of the bike seat so that your knee is slightly bent when your leg is extended downward. If your knee hurts when the pedal reaches the top, you can raise the seat so that your knee does not bend as much. 3. Start slowly. At first, try to do 5 to 10 minutes of cycling with little to no resistance. Then increase your time and the resistance bit by bit until you can do 20 to 30 minutes without pain. 4. If you start to have pain, rest your knee until your pain gets back to the level that is normal for you. Or cycle for less time or with less effort. Follow-up care is a key part of your treatment and safety. Be sure to make and go to all appointments, and call your doctor if you are having problems. It's also a good idea to know your test results and keep a list of the medicines you take. Where can you learn more? Go to http://guido-nader.info/. Enter C159 in the search box to learn more about \"Knee Arthritis: Exercises. \"  Current as of: September 20, 2018  Content Version: 12.1  © 4321-4978 [employer]. Care instructions adapted under license by [employer] (which disclaims liability or warranty for this information). If you have questions about a medical condition or this instruction, always ask your healthcare professional. [address] any warranty or liability for your use of this information.

## 2019-09-05 NOTE — ED TRIAGE NOTES
Right knee pain since Friday. No known injury but was dancing for her birthday. Associated with swelling. Tried ice and heat. Was unable to walk on her knee on Saturday.

## 2019-09-05 NOTE — ED NOTES
Patient presents she states her knee started hurting on Fri, she states the only thing she did different was dancing at her 49th bday party.

## 2019-09-05 NOTE — ED NOTES
Discharge instructions given to patient by CATRACHITO Blanco. Verbalized understanding of instructions. Patient discharged without difficulty. Patient discharged in stable condition ambulatory.

## 2020-09-16 ENCOUNTER — OFFICE VISIT (OUTPATIENT)
Dept: NEUROLOGY | Age: 50
End: 2020-09-16
Payer: COMMERCIAL

## 2020-09-16 VITALS
BODY MASS INDEX: 40.82 KG/M2 | WEIGHT: 254 LBS | HEIGHT: 66 IN | SYSTOLIC BLOOD PRESSURE: 124 MMHG | OXYGEN SATURATION: 96 % | HEART RATE: 82 BPM | DIASTOLIC BLOOD PRESSURE: 82 MMHG | TEMPERATURE: 98.2 F

## 2020-09-16 DIAGNOSIS — G56.03 BILATERAL CARPAL TUNNEL SYNDROME: Primary | ICD-10-CM

## 2020-09-16 DIAGNOSIS — M54.12 CERVICAL RADICULOPATHY: ICD-10-CM

## 2020-09-16 DIAGNOSIS — M54.2 NECK PAIN: ICD-10-CM

## 2020-09-16 DIAGNOSIS — M47.22 OSTEOARTHRITIS OF SPINE WITH RADICULOPATHY, CERVICAL REGION: ICD-10-CM

## 2020-09-16 PROCEDURE — 99215 OFFICE O/P EST HI 40 MIN: CPT | Performed by: PSYCHIATRY & NEUROLOGY

## 2020-09-16 RX ORDER — MECLIZINE HYDROCHLORIDE 25 MG/1
TABLET ORAL
COMMUNITY
Start: 2019-11-20

## 2020-09-16 RX ORDER — CYCLOBENZAPRINE HCL 10 MG
10 TABLET ORAL
Qty: 90 TAB | Refills: 3 | OUTPATIENT
Start: 2020-09-16

## 2020-09-16 RX ORDER — DULAGLUTIDE 1.5 MG/.5ML
INJECTION, SOLUTION SUBCUTANEOUS
COMMUNITY
Start: 2020-02-20

## 2020-09-16 RX ORDER — PHENTERMINE HYDROCHLORIDE 37.5 MG/1
TABLET ORAL
COMMUNITY
Start: 2019-12-07 | End: 2021-01-08

## 2020-09-16 RX ORDER — ERGOCALCIFEROL 1.25 MG/1
CAPSULE ORAL
COMMUNITY
Start: 2020-09-06 | End: 2021-01-08

## 2020-09-16 RX ORDER — GABAPENTIN 300 MG/1
CAPSULE ORAL
COMMUNITY
Start: 2020-07-14 | End: 2021-01-08

## 2020-09-16 RX ORDER — DICLOFENAC SODIUM 10 MG/G
GEL TOPICAL 4 TIMES DAILY
Qty: 100 G | Refills: 3 | Status: SHIPPED | OUTPATIENT
Start: 2020-09-16

## 2020-09-16 RX ORDER — CYCLOBENZAPRINE HCL 10 MG
10 TABLET ORAL
Qty: 90 TAB | Refills: 3 | Status: SHIPPED | OUTPATIENT
Start: 2020-09-16

## 2020-09-16 RX ORDER — ATORVASTATIN CALCIUM 40 MG/1
TABLET, FILM COATED ORAL
COMMUNITY
Start: 2020-01-15

## 2020-09-16 NOTE — PATIENT INSTRUCTIONS
To help with the carpal tunnel syndrome type symptoms I want to wear wrist splints at night Ice down your wrist for about 10 minutes at the end of each workday when you are at home And apply Voltaren gel 3-4 times a day We can consider physical therapy if you are not getting adequate relief and they can apply techniques such as ultrasound to help reduce inflammation And finally you can also consider repeat EMGs and possible refer to hand surgeon We will get you set up with Dr. Kt Martinez in 3 months and I am available sooner if needed

## 2020-09-16 NOTE — TELEPHONE ENCOUNTER
Patient needs a refill for    Requested Prescriptions     Pending Prescriptions Disp Refills    cyclobenzaprine (FLEXERIL) 10 mg tablet 90 Tab 3     Sig: Take 1 Tab by mouth nightly.  Indications: muscle spasm     Future Appointments   Date Time Provider Avery Liz   12/16/2020 11:00 AM MD EZEKIEL Shaw BS AMB                         Last Appointment My Department:  9/16/2020    Please review

## 2020-09-16 NOTE — PROGRESS NOTES
Renetta Giordano is a 45 y.o. female who presents today for the following:  Chief Complaint   Patient presents with    Follow-up     numbness in rt hand        This is an in office visit    HPI  Historical Data    Patient is been seen previously in the past most recently by Dr. Lorena Bush    Neurologic diagnosis  Degenerative disease of the cervical spine  MRI only showed mild bulging with no foraminal stenosis     neck pain  Historically maintained on cyclobenzaprine        Interim Data:     Neck pain  Patient states generally well controlled on cyclobenzaprine but she has been out of that and would like it renewed    Left hand tingling  Patient states it usually in her 3 middle fingers but sometimes it can be in all of them  She works as a nurse  She uses a computer frequently  EMGs in the past are most consistent with C6 radiculopathy   MRI from April 2018 shows bulging disks at C5-6 and C6-7 but not felt to be surgical  Patient states she was told she does have some carpal tunnel syndrome  She has not been using any specific techniques to help manage this        Allergies   Allergen Reactions    Pcn [Penicillins] Anaphylaxis    Clindamycin Other (comments)    Vicodin [Hydrocodone-Acetaminophen] Itching       Current Outpatient Medications   Medication Sig    meclizine (ANTIVERT) 25 mg tablet TK 1 T PO  TID TO QID PRF DIZZINESS.  dulaglutide (Trulicity) 1.5 BJ/2.5 mL sub-q pen     phentermine (ADIPEX-P) 37.5 mg tablet TK 1 T PO QD BEFORE BREAKFAST    atorvastatin (LIPITOR) 40 mg tablet TK 1 T PO QD    Arm Brace misc Bilateral carpal tunnel splint to be used every night    diclofenac (VOLTAREN) 1 % gel Apply  to affected area four (4) times daily.  cyclobenzaprine (FLEXERIL) 10 mg tablet Take 1 Tab by mouth nightly. Indications: muscle spasm    meloxicam (MOBIC) 15 mg tablet Take 1 Tab by mouth daily.  buPROPion XL (WELLBUTRIN XL) 300 mg XL tablet Take 300 mg by mouth every morning.     atenolol-chlorthalidone (TENORETIC) 50-25 mg per tablet Take 1 Tab by mouth daily.  butalbital-acetaminophen-caffeine (FIORICET) -40 mg per tablet Take 1-2 Tabs by mouth every four (4) hours as needed for Pain. Max Daily Amount: 12 Tabs.  furosemide (LASIX) 40 mg tablet Take  by mouth daily.  ergocalciferol (ERGOCALCIFEROL) 1,250 mcg (50,000 unit) capsule TK 1 C PO Q WK    gabapentin (NEURONTIN) 300 mg capsule TK ONE C PO  QD    naproxen (NAPROSYN) 500 mg tablet Take 1 Tab by mouth every twelve (12) hours as needed for Pain.  aspirin 81 mg chewable tablet Take 1 Tab by mouth daily. No current facility-administered medications for this visit. Past Medical History:   Diagnosis Date    Constipation     Essential hypertension     Headache     Hypertension     MARIA M (obstructive sleep apnea)     Stroke [facility])     TIA (transient ischemic attack) 2011       Past Surgical History:   Procedure Laterality Date    HX  SECTION  1989    HX PARTIAL HYSTERECTOMY         Family History   Problem Relation Age of Onset    Kidney Disease Mother     Stroke Paternal Uncle     Heart Disease Maternal Grandfather     Stroke Paternal Grandmother     Cancer Paternal Grandmother        Social History     Socioeconomic History    Marital status: SINGLE     Spouse name: Not on file    Number of children: Not on file    Years of education: Not on file    Highest education level: Not on file   Occupational History    Occupation: nurse     Employer: OTHER     Comment: [employer]    Tobacco Use    Smoking status: Current Every Day Smoker     Packs/day: 1.50     Years: 20.00     Pack years: 30.00     Types: Cigarettes    Smokeless tobacco: Never Used   Substance and Sexual Activity    Alcohol use:  Yes     Alcohol/week: 5.0 standard drinks     Types: 5 Glasses of wine per week    Drug use: No    Sexual activity: Yes     Partners: Male   Social History Narrative Works (3) 14 hour shifts per week at [employer]. In home with spouse. ROS  Other than what is stated above under HPI there is no new or changing issues related to the following:  Constitutional: No recent weight change, fever,fatigue, sleep difficulties, or loss of appetite. ENT/Mouth:  No hearing loss, ringing in the ears, chronic sinus problem, nose bleeds sore throat, voice change, hoarseness, swollen glands in neck, or difficulties with chewing and swallowing. Cardiovascular:  No chest pain/angina pectoris, palpitations,swelling of feet/ankles/hands, or calf pain while walking. Respiratory: No chronic or frequent coughs, spitting up blood, shortness of breath, asthma, or wheezing. Gastrointestinal: No abdominal pain, heartburn, nausea, vomiting, constipation, frequent diarrhea, rectal bleeding, or blood in stool. Genitourinary: No frequent urination, burning or painful urination, blood in urine, incontinence or dribbling. Musculoskeletal:   No joint pain, stiffness/swelling, weakness of muscles, muscle pain/cramp, or back pain. Integument:   No rash/itching, change in skin color, change in hair/nails, or change in color/size of moles. Neurological:  No dizziness/vertigo, loss of consciousness, numbness/tingling sensation, tremors, weakness in limbs, difficulty with balance, frequent or recurring headaches, memory loss or confusion. Psychiatric:   No nervousness, depression, hallucinations, paranoia or suspiciousness. Endocrine: No excessive thirst or urination, heat or cold intolerance. Hematologic/Lymphatic: No bleeding/bruising tendency, phlebitis, or past transfusion. EXAMINATION  Visit Vitals  /82   Pulse 82   Temp 98.2 °F (36.8 °C) (Oral)   Ht 5' 6\" (1.676 m)   Wt 254 lb (115.2 kg)   SpO2 96%   BMI 41.00 kg/m²            General appearance: Patient is well-developed and well-nourished in no apparent distress and well groomed.     Psych/mental health:  Affect: Appropriate    PHQ  3 most recent PHQ Screens 1/30/2019   Little interest or pleasure in doing things Nearly every day   Feeling down, depressed, irritable, or hopeless Nearly every day   Total Score PHQ 2 6   Trouble falling or staying asleep, or sleeping too much Nearly every day   Feeling tired or having little energy Not at all   Poor appetite, weight loss, or overeating Several days   Feeling bad about yourself - or that you are a failure or have let yourself or your family down Not at all   Trouble concentrating on things such as school, work, reading, or watching TV Not at all   Moving or speaking so slowly that other people could have noticed; or the opposite being so fidgety that others notice Not at all   Thoughts of being better off dead, or hurting yourself in some way Not at all   PHQ 9 Score 10   How difficult have these problems made it for you to do your work, take care of your home and get along with others Somewhat difficult       HEENT: Normocephalic, without evidence of trauma  Full range of motion, no tenderness to palpation in the head or neck region     Cardiovascular: Extremities warm to touch, no swelling appreciated    Respiratory: No dyspnea on exertion noted, normal effort on casual observation    Musculoskeletal: No evidence of significant bony deformities or spinal curvature    Integumentary:  No obvious bruising, lacerations or discoloaration on casual observation. Neurological Examination:   Mental Status:        MMSE  No flowsheet data found. Formal testing was not completed    there was nothing concerning on general observation and discussion.    Alert oriented and appropriate to general conversation  Normal processing on general observation  Followed conversation and responded seemingly appropriate throughout the office visit  No word finding difficulties noted on casual observation  Able to follow directions without difficulty     Cranial Nerves:    MECCALA EOMs intact gaze is conjugate  No nystagmus is appreciated  No TOMER  Facial motor and sensory intact bilaterally  Hearing intact to conversation  Voice with normal projection, no evidence of secretion pooling  Palate elevates symmetrically  Shoulder shrug intact bilaterally  No tongue deviation appreciated     Motor:   Normal tone and bulk except for bilateral thenar and hyperthenar atrophy  Fine dexterity intact bilaterally  No tremor appreciated on today's exam  No abnormal movements appreciated on today's exam    Muscle strength testing:  Right side  Upper extremities  Deltoid 5/5  Bicep 5/5  Tricep 5/5  Hand grasp 5/5    Lower extremity  Hip flexor 5/5  Extensor 5/5  Flexor 5/5  Plantar flexion 5/5  Dorsiflexion 5/5      Left side  Upper extremity  Deltoid 5/5  Bicep 5/5  Tricep 5/5  Hand grasp 5/5    Lower extremity  Hip flexor 5/5  Extensor 5/5  Flexor 5/5  Plantar flexion 5/5  Dorsiflexion 5/5    Sensation: Intact to light touch bilaterally    Coordination/Cerebellar:   Grossly intact    Gait: Ambulates independently    Fall risk assessment  No flowsheet data found. Reflexes: Not tested    ASSESSMENT AND PLAN    Patient is known to this practice. This is my first time seeing the patient. Chart and history reviewed in detail at today's office visit.     Carpal tunnel syndrome sensory symptomatic right arm; with bilateral thenar and hyperthenar atrophy suggestive of bilateral involvement but functionally not impaired  We talked about repeat EMG patient is not interested in pursuing that presently  We discussed conservative measures which she is more interested in subsequently the following that recommendations have been made:  Bilateral wrist splints nightly  Icing both wrists for about 10 minutes at the end of the workday each day  Using Voltaren topical cream 3-4 times a day    Regarding cervical neck pain with osteoarthritis of the cervical spine with radiculopathy  Flexeril is been renewed  It is possible this is contributing to her symptoms in her right hand but I would anticipate a bit more involvement than just the fingers    Follow-up in 3 months with Dr. Kt Martinez per patient ready request sooner with me if needed        ICD-10-CM ICD-9-CM    1. Bilateral carpal tunnel syndrome  G56.03 354.0 Arm Brace misc      diclofenac (VOLTAREN) 1 % gel   2. Neck pain  M54.2 723.1 cyclobenzaprine (FLEXERIL) 10 mg tablet   3. Osteoarthritis of spine with radiculopathy, cervical region  M47.22 721.0    4. Cervical radiculopathy  M54.12 723.4            Additional pertinent medical data reviewed at today's office visit:       No visits with results within 3 Month(s) from this visit. Latest known visit with results is:   Admission on 01/01/2019, Discharged on 01/03/2019   Component Date Value    WBC 01/01/2019 10.8     RBC 01/01/2019 4.79     HGB 01/01/2019 13.1     HCT 01/01/2019 39.5     MCV 01/01/2019 82.5     MCH 01/01/2019 27.3     MCHC 01/01/2019 33.2     RDW 01/01/2019 17.2*    PLATELET 52/76/3318 117     MPV 01/01/2019 10.7     NRBC 01/01/2019 0.0     ABSOLUTE NRBC 01/01/2019 0.00     NEUTROPHILS 01/01/2019 60     LYMPHOCYTES 01/01/2019 30     MONOCYTES 01/01/2019 7     EOSINOPHILS 01/01/2019 2     BASOPHILS 01/01/2019 0     IMMATURE GRANULOCYTES 01/01/2019 1*    ABS. NEUTROPHILS 01/01/2019 6.6     ABS. LYMPHOCYTES 01/01/2019 3.3     ABS. MONOCYTES 01/01/2019 0.7     ABS. EOSINOPHILS 01/01/2019 0.2     ABS. BASOPHILS 01/01/2019 0.0     ABS. IMM.  GRANS. 01/01/2019 0.1*    DF 01/01/2019 AUTOMATED     Ventricular Rate 01/01/2019 74     Atrial Rate 01/01/2019 74     P-R Interval 01/01/2019 158     QRS Duration 01/01/2019 86     Q-T Interval 01/01/2019 404     QTC Calculation (Bezet) 01/01/2019 448     Calculated P Axis 01/01/2019 40     Calculated R Axis 01/01/2019 21     Calculated T Axis 01/01/2019 17     Diagnosis 01/01/2019                      Value:Normal sinus rhythm  Possible Left atrial enlargement  Nonspecific T wave flattening  When compared with ECG of 02-DEC-2012 11:43,  Nonspecific T wave abnormality now evident in Anterior leads  Confirmed by Aidan Elias ([id number]) on 1/2/2019 3:53:52 PM      Color 01/01/2019 YELLOW/STRAW     Appearance 01/01/2019 CLEAR     Specific gravity 01/01/2019 1.005     pH (UA) 01/01/2019 7.0     Protein 01/01/2019 NEGATIVE      Glucose 01/01/2019 NEGATIVE      Ketone 01/01/2019 NEGATIVE      Bilirubin 01/01/2019 NEGATIVE      Blood 01/01/2019 NEGATIVE      Urobilinogen 01/01/2019 1.0     Nitrites 01/01/2019 NEGATIVE      Leukocyte Esterase 01/01/2019 NEGATIVE      UA:UC IF INDICATED 01/01/2019 URINE CULTURE ORDERED*    WBC 01/01/2019 0-4     RBC 01/01/2019 0-5     Epithelial cells 01/01/2019 FEW     Bacteria 01/01/2019 1+*    Hyaline cast 01/01/2019 0-2     Sodium 01/01/2019 140     Potassium 01/01/2019 2.6*    Chloride 01/01/2019 103     CO2 01/01/2019 28     Anion gap 01/01/2019 9     Glucose 01/01/2019 96     BUN 01/01/2019 17     Creatinine 01/01/2019 0.72     BUN/Creatinine ratio 01/01/2019 24*    GFR est AA 01/01/2019 >60     GFR est non-AA 01/01/2019 >60     Calcium 01/01/2019 8.6     INR 01/01/2019 1.0     Prothrombin time 01/01/2019 9.8     Glucose (POC) 01/01/2019 100     Performed by 01/01/2019 REDD CUMMINGS (SON)     Protein, total 01/01/2019 7.7     Albumin 01/01/2019 3.5     Globulin 01/01/2019 4.2*    A-G Ratio 01/01/2019 0.8*    Bilirubin, total 01/01/2019 0.4     Bilirubin, direct 01/01/2019 0.1     Alk.  phosphatase 01/01/2019 119*    AST (SGOT) 01/01/2019 13*    ALT (SGPT) 01/01/2019 25     Special Requests: 01/01/2019                      Value:NO SPECIAL REQUESTS  Reflexed from [id number]      [place] Count 01/01/2019                      Value:>100,000  COLONIES/mL      Culture result: 01/01/2019 MIXED UROGENITAL PIERRE ISOLATED     Magnesium 01/01/2019 2.5*    AMPHETAMINES 01/01/2019 POSITIVE*    BARBITURATES 01/01/2019 NEGATIVE      BENZODIAZEPINES 01/01/2019 NEGATIVE      COCAINE 01/01/2019 NEGATIVE      METHADONE 01/01/2019 NEGATIVE      OPIATES 01/01/2019 NEGATIVE      PCP(PHENCYCLIDINE) 01/01/2019 NEGATIVE      THC (TH-CANNABINOL) 01/01/2019 NEGATIVE      Drug screen comment 01/01/2019 (NOTE)     LIPID PROFILE 01/02/2019         Cholesterol, total 01/02/2019 205*    Triglyceride 01/02/2019 123     HDL Cholesterol 01/02/2019 49     LDL, calculated 01/02/2019 131.4*    VLDL, calculated 01/02/2019 24.6     CHOL/HDL Ratio 01/02/2019 4.2     Hemoglobin A1c 01/02/2019 6.1     Est. average glucose 01/02/2019 128     WBC 01/02/2019 7.8     RBC 01/02/2019 4.42     HGB 01/02/2019 11.9     HCT 01/02/2019 37.3     MCV 01/02/2019 84.4     MCH 01/02/2019 26.9     MCHC 01/02/2019 31.9     RDW 01/02/2019 17.3*    PLATELET 04/24/0689 714     MPV 01/02/2019 10.8     NRBC 01/02/2019 0.0     ABSOLUTE NRBC 01/02/2019 0.00     Sodium 01/02/2019 140     Potassium 01/02/2019 2.8*    Chloride 01/02/2019 106     CO2 01/02/2019 25     Anion gap 01/02/2019 9     Glucose 01/02/2019 95     BUN 01/02/2019 11     Creatinine 01/02/2019 0.69     BUN/Creatinine ratio 01/02/2019 16     GFR est AA 01/02/2019 >60     GFR est non-AA 01/02/2019 >60     Calcium 01/02/2019 7.9*    Magnesium 01/02/2019 2.4     Phosphorus 01/02/2019 3.5     TSH 01/02/2019 1.92     Sodium 01/03/2019 141     Potassium 01/03/2019 3.4*    Chloride 01/03/2019 106     CO2 01/03/2019 28     Anion gap 01/03/2019 7     Glucose 01/03/2019 99     BUN 01/03/2019 12     Creatinine 01/03/2019 0.75     BUN/Creatinine ratio 01/03/2019 16     GFR est AA 01/03/2019 >60     GFR est non-AA 01/03/2019 >60     Calcium 01/03/2019 8.2*    WBC 01/03/2019 8.5     RBC 01/03/2019 4.40     HGB 01/03/2019 11.9     HCT 01/03/2019 37.2     MCV 01/03/2019 84.5     MCH 01/03/2019 27.0     MCHC 01/03/2019 32.0     RDW 01/03/2019 17.4*    PLATELET 36/04/2560 296     MPV 01/03/2019 11.1     NRBC 01/03/2019 0.0     ABSOLUTE NRBC 01/03/2019 0.00     NEUTROPHILS 01/03/2019 54     LYMPHOCYTES 01/03/2019 35     MONOCYTES 01/03/2019 7     EOSINOPHILS 01/03/2019 3     BASOPHILS 01/03/2019 0     IMMATURE GRANULOCYTES 01/03/2019 1*    ABS. NEUTROPHILS 01/03/2019 4.6     ABS. LYMPHOCYTES 01/03/2019 3.0     ABS. MONOCYTES 01/03/2019 0.6     ABS. EOSINOPHILS 01/03/2019 0.2     ABS. BASOPHILS 01/03/2019 0.0     ABS. IMM. GRANS. 01/03/2019 0.0     DF 01/03/2019 AUTOMATED     Magnesium 01/03/2019 2.3        XR Results (maximum last 3): Results from [place] encounter on 09/04/19   XR KNEE RT 3 V    Impression IMPRESSION: Moderate osteoarthritis. Results from Appointment encounter on 06/17/19   XR KNEE RT 3 V    Impression IMPRESSION: Osteoarthrosis, mild-moderate. Results from [place] encounter on 05/09/16   XR SPINE LUMB 2 OR 3 V       CT Results (maximum last 3): Results from [place] encounter on 01/01/19   CTA CODE NEURO HEAD AND NECK W CONT    Impression Impression:    No acute large vessel arterial occlusion or significant stenosis. CT CODE NEURO HEAD WO CONTRAST    Impression IMPRESSION: Normal.     Results from Hospital Encounter encounter on 12/02/12   CT HEAD WO CONT       MRI Results (maximum last 3): Results from [place] encounter on 04/05/19   MRI CERV SPINE WO CONT    Impression IMPRESSION:  No localizing disc herniation or spinal stenosis. No significant foraminal  narrowing. Mild bulging discs at C5-6 and C6-7. Results from [place] encounter on 01/01/19   MRI BRAIN WO CONT    Impression IMPRESSION:    No evidence for acute infarction. Mild nonspecific periventricular signal abnormality.     Small air-fluid level in the left maxillary sinus         Results from Hospital Encounter encounter on 06/17/16   MRI LUMB SPINE W WO CONT Follow-up and Dispositions    · Return in about 3 months (around 12/16/2020) for In office appointment w Dr Noé Saeed.            Sandy Espinoza MS, ANP-BC, [facility]

## 2020-12-16 ENCOUNTER — VIRTUAL VISIT (OUTPATIENT)
Dept: NEUROLOGY | Age: 50
End: 2020-12-16

## 2020-12-16 ENCOUNTER — TELEPHONE (OUTPATIENT)
Dept: NEUROLOGY | Age: 50
End: 2020-12-16

## 2020-12-16 NOTE — PROGRESS NOTES
Identified pt with two pt identifiers(name and ). Reviewed record in preparation for visit and have obtained necessary documentation. Chief Complaint   Patient presents with    Numbness     follow-up; right hand; worsened; tingling in feet and legs now as well      There were no vitals filed for this visit. Health Maintenance Review: Patient reminded of \"due or due soon\" health maintenance. I have asked the patient to contact his/her primary care provider (PCP) for follow-up on his/her health maintenance. Coordination of Care Questionnaire:  :   1) Have you been to an emergency room, urgent care, or hospitalized since your last visit? If yes, where when, and reason for visit? no       2. Have seen or consulted any other health care provider since your last visit? If yes, where when, and reason for visit? NO      Patient is accompanied by self I have received verbal consent from Cecy Quiñonez to discuss any/all medical information while they are present in the room.

## 2020-12-16 NOTE — TELEPHONE ENCOUNTER
----- Message from Isabelle Rebolledo sent at 12/16/2020 12:44 PM EST -----  Regarding: Dr. Mason Austin Message/Vendor Calls    Caller's first and last name: Patient      Reason for call: VV      Callback required yes/no and why: Yes. To advise      Best contact number(s): [phone]      Details to clarify the request:Patient did not receive the call for the vv 12/16/2020 11:00 am. Please conatc to advise .  Patient is scheduled to be at work 12:00 pm       Rubi Rebolledo

## 2021-01-08 ENCOUNTER — VIRTUAL VISIT (OUTPATIENT)
Dept: NEUROLOGY | Age: 51
End: 2021-01-08
Payer: COMMERCIAL

## 2021-01-08 DIAGNOSIS — E78.49 OTHER HYPERLIPIDEMIA: ICD-10-CM

## 2021-01-08 DIAGNOSIS — I10 ESSENTIAL HYPERTENSION: ICD-10-CM

## 2021-01-08 DIAGNOSIS — G56.01 CARPAL TUNNEL SYNDROME OF RIGHT WRIST: Primary | ICD-10-CM

## 2021-01-08 PROCEDURE — 99213 OFFICE O/P EST LOW 20 MIN: CPT | Performed by: PSYCHIATRY & NEUROLOGY

## 2021-01-08 RX ORDER — GABAPENTIN 300 MG/1
300 CAPSULE ORAL 2 TIMES DAILY
Qty: 60 CAP | Refills: 3 | Status: SHIPPED | OUTPATIENT
Start: 2021-01-08

## 2021-01-08 NOTE — PROGRESS NOTES
Name: Darylene Console    Chief Complaint: Neck pain    Returns for follow-up visit. She now has severe tingling and numbness affecting the right hand. It wakes her up at night. She finds it intolerable at work. It affects all fingers of the right hand. She finds her self flicking her hands in the morning to try to get rid of the tingling and numbness. Assesment and Plan  1. Carpal tunnel syndrome  Refer to [address] for EMG  Start gabapentin 300 mg nightly    2. Essential hypertension  continue Tenoretic    3. hypercholesterolemia  Continue Lipitor         Allergies  Pcn [penicillins], Clindamycin, and Vicodin [hydrocodone-acetaminophen]     Medications  Current Outpatient Medications   Medication Sig    meclizine (ANTIVERT) 25 mg tablet TK 1 T PO  TID TO QID PRF DIZZINESS.  dulaglutide (Trulicity) 1.5 UV/1.2 mL sub-q pen     atorvastatin (LIPITOR) 40 mg tablet TK 1 T PO QD    Arm Brace misc Bilateral carpal tunnel splint to be used every night    diclofenac (VOLTAREN) 1 % gel Apply  to affected area four (4) times daily.  cyclobenzaprine (FLEXERIL) 10 mg tablet Take 1 Tab by mouth nightly. Indications: muscle spasm    meloxicam (MOBIC) 15 mg tablet Take 1 Tab by mouth daily.  buPROPion XL (WELLBUTRIN XL) 300 mg XL tablet Take 300 mg by mouth every morning.  naproxen (NAPROSYN) 500 mg tablet Take 1 Tab by mouth every twelve (12) hours as needed for Pain.  atenolol-chlorthalidone (TENORETIC) 50-25 mg per tablet Take 1 Tab by mouth daily.  aspirin 81 mg chewable tablet Take 1 Tab by mouth daily.  butalbital-acetaminophen-caffeine (FIORICET) -40 mg per tablet Take 1-2 Tabs by mouth every four (4) hours as needed for Pain. Max Daily Amount: 12 Tabs.  furosemide (LASIX) 40 mg tablet Take  by mouth daily. No current facility-administered medications for this visit.          Medical History  Past Medical History:   Diagnosis Date    Constipation     Essential hypertension 2010    Headache     Hypertension     MARIA M (obstructive sleep apnea)     Stroke [facility])     TIA (transient ischemic attack) 2011 2012     Review of Systems   Gastrointestinal: Negative for heartburn and nausea. Genitourinary: Negative for dysuria and urgency. Musculoskeletal: Positive for myalgias and neck pain. Neurological: Positive for sensory change and headaches. Endo/Heme/Allergies: Negative for environmental allergies. Does not bruise/bleed easily. Psychiatric/Behavioral: Negative for depression. The patient is nervous/anxious. Exam:  There were no vitals taken for this visit. General: Well developed, well nourished. Patient in no apparent distress   Head: Normocephalic, atraumatic, anicteric sclera   Neck Normal ROM, No thyromegally   Lungs:  Normal effort   Abd: Bowel sounds were audible. No tenderness on palpation   Ext: No pedal edema   Skin: Supple no rash     NeurologicExam:  Mental Status: Alert and oriented to person place and time   Speech: Fluent no aphasia or dysarthria. Cranial Nerves:  II - XII Intact   Motor:  Full and symmetric strength of upper and lower proximal and distal muscles. Normal bulk and tone. Sensory:   Loss of sensation to palmar aspect of fingers of the right heand   Gait:  Gait is balanced and fluid with normal arm swing. Tremor:   No tremor noted. Cerebellar:  Coordination intact. Neurovascular: No carotid bruits. No JVD     Imaging  CT Results (most recent):  Results from Hospital Encounter encounter on 01/01/19   CTA CODE NEURO HEAD AND NECK W CONT    Narrative *PRELIMINARY REPORT*    No large vessel occlusion. Preliminary report was provided by Dr. Holly Uribe, the on-call radiologist, at  11:46    Final report to follow.     *END PRELIMINARY REPORT*    CLINICAL HISTORY: Vertigo and right-sided weakness    EXAMINATION:  CT ANGIOGRAPHY HEAD AND NECK     COMPARISON: CT head 1/1/2019, 12/2/2012    TECHNIQUE:  Following the uneventful administration of iodinated contrast  material, axial CT angiography of the head and neck was performed. Delayed axial  images through the head were also obtained. Coronal and sagittal reconstructions  were obtained. Manual postprocessing of images was performed. 3-D  Sagittal  maximal intensity projection images were obtained. 3-D Coronal maximal  intensity projections were obtained. CT dose reduction was achieved through use  of a standardized protocol tailored for this examination and automatic exposure  control for dose modulation. FINDINGS:    Delayed contrast-enhanced head CT:    The ventricles are midline without hydrocephalus. There is no acute intra or  extra-axial hemorrhage. Gray-white matter differentiation is preserved. The  basal cisterns are clear. The paranasal sinuses are clear. CTA NECK:    Great vessels: Normal arch anatomy with the origins patent. Right subclavian artery: Patent    Left subclavian artery: Patent    Right common carotid artery: Patent    Left common carotid artery: Patent    Cervical right internal carotid artery: Patent with no significant stenosis by  NASCET criteria. Cervical left internal carotid artery: Patent with no significant stenosis by  NASCET criteria. Right vertebral artery: Patent    Left vertebral artery: Patent    Mild cervical spondylosis    CTA HEAD:    Right cavernous internal carotid artery: Patent    Left cavernous internal carotid artery: Patent    Anterior cerebral arteries: Patent    Anterior communicating artery: Patent    Right middle cerebral artery: Patent    Left middle cerebral artery: Patent    Posterior communicating arteries: Left is patent. Right is hypoplastic    Posterior cerebral arteries: Patent    Basilar artery: Patent    Distal vertebral arteries: Patent      Impression Impression:    No acute large vessel arterial occlusion or significant stenosis.                       MRI Results (most recent):  Results from Hospital Encounter encounter on 04/05/19   MRI CERV SPINE WO CONT    Narrative EXAM: MRI CERV SPINE WO CONT    INDICATION: cervical radiculopathy. COMPARISON: None    TECHNIQUE: MR imaging of the cervical spine was performed using the following  sequences: sagittal T1, T2, STIR;  axial T2, T1.     CONTRAST:  None. FINDINGS:    There is normal alignment of the cervical spine. Vertebral body heights are  maintained. Marrow signal is normal.    The craniocervical junction is intact. The course, caliber, and signal intensity  of the spinal cord are normal.      The paraspinal soft tissues are within normal limits. Within the visualized  portion of the brain, a partial empty sella is noted with mild enlargement of  the sella. C2-C3: No herniation or stenosis. C3-C4: No herniation or stenosis. C4-C5: No herniation or stenosis. C5-C6: No herniation or stenosis. Mild bulging disc. C6-C7: No herniation or stenosis. Mild bulging disc. C7-T1: No herniation or stenosis. Impression IMPRESSION:  No localizing disc herniation or spinal stenosis. No significant foraminal  narrowing. Mild bulging discs at C5-6 and C6-7.          Lab Review  Lab Results   Component Value Date/Time    WBC 8.5 01/03/2019 03:27 AM    HCT 37.2 01/03/2019 03:27 AM    HGB 11.9 01/03/2019 03:27 AM    PLATELET 722 18/75/2992 03:27 AM       Lab Results   Component Value Date/Time    Sodium 141 01/03/2019 03:27 AM    Potassium 3.4 (L) 01/03/2019 03:27 AM    Chloride 106 01/03/2019 03:27 AM    CO2 28 01/03/2019 03:27 AM    Glucose 99 01/03/2019 03:27 AM    BUN 12 01/03/2019 03:27 AM    Creatinine 0.75 01/03/2019 03:27 AM    Calcium 8.2 (L) 01/03/2019 03:27 AM         Lab Results   Component Value Date/Time    Hemoglobin A1c 6.1 01/02/2019 03:38 AM       Lab Results   Component Value Date/Time    Cholesterol, total 205 (H) 01/02/2019 03:38 AM    HDL Cholesterol 49 01/02/2019 03:38 AM    LDL, calculated 131.4 (H) 01/02/2019 03:38 AM    VLDL, calculated 24.6 01/02/2019 03:38 AM    Triglyceride 123 01/02/2019 03:38 AM    CHOL/HDL Ratio 4.2 01/02/2019 03:38 AM     Fredi Stanley was seen by synchronous (real-time) audio-video technology on 01/08/21. Consent:  She and/or her healthcare decision maker is aware that this patient-initiated Telehealth encounter is a billable service, with coverage as determined by her insurance carrier. She is aware that she may receive a bill and has provided verbal consent to proceed: Yes    I was in the office while conducting this encounter. Pursuant to the emergency declaration under the [facility], 1135 waiver authority and the [employer] and [employer] General Act, this Virtual  Visit was conducted, with patient's consent, to reduce the patient's risk of exposure to COVID-19 and provide continuity of care for an established patient. Services were provided through a video synchronous discussion virtually to substitute for in-person clinic visit.

## 2021-01-18 ENCOUNTER — OFFICE VISIT (OUTPATIENT)
Dept: NEUROLOGY | Age: 51
End: 2021-01-18
Payer: COMMERCIAL

## 2021-01-18 DIAGNOSIS — G56.01 CARPAL TUNNEL SYNDROME OF RIGHT WRIST: Primary | ICD-10-CM

## 2021-01-18 PROCEDURE — 95912 NRV CNDJ TEST 11-12 STUDIES: CPT | Performed by: PSYCHIATRY & NEUROLOGY

## 2021-01-18 NOTE — PROCEDURES
Electrodiagnostic Study Report  Test Date:  2021    Patient: Darren Power : 1970 Physician: Salty Flannery MD   Sex: Female Tech:  Ref Phys:        History:   45year old right handed female who presents with numbness of the right hand. EMG & NCV Findings:  Absent right  median antidromic sensory SNAP. Delayed right median motor CMAP  Remaining nerves fall within normal reference.      Impression  Severe right median nerve compression at the wrist. Surgical attention is recommended.       __________________  Salty Flannery M.D.      Nerve Conduction Studies  Anti Sensory Summary Table     Site NR Peak (ms) Norm Peak (ms) O-P Amp (µV) Norm O-P Amp Site1 Site2 Dist (cm)   Left Median Anti Sensory (2nd Digit)   Wrist    3.7 <4 22.5 >11 Wrist 2nd Digit 14.0   Right Median Anti Sensory (2nd Digit)   Wrist NR  <4  >11 Wrist 2nd Digit 14.0   Left Ulnar Anti Sensory (5th Digit)   Wrist    2.7 <4.0 38.1 >10 Wrist 5th Digit 14.0   Right Ulnar Anti Sensory (5th Digit)   Wrist    2.9 <4.0 27.2 >10 Wrist 5th Digit 14.0     Ortho Sensory Summary Table     Site NR Peak (ms) O-P Amp (µV) Site1 Site2 Dist (cm) Scotty (m/s)   Right Median Ortho Sensory (Wrist)   Palm   NR   Palm Wrist       Motor Summary Table     Site NR Onset (ms) Norm Onset (ms) O-P* Amp (mV) Norm O-P Amp P-T Amp (mV) Site1 Site2 Dist (cm) Scotty (m/s)   Left Median Motor (Abd Poll Brev)   Wrist    3.8 <4.5 9.3 >4.1  Wrist Abd Poll Brev 8.0 21   Elbow    6.9  4.1   Elbow Wrist 16.0 52   Right Median Motor (Abd Poll Brev)   Wrist    5.2 <4.5 4.9 >4.1  Wrist Abd Poll Brev 8.0 15   Elbow    8.7  6.4   Elbow Wrist 19.0 54   Right Ulnar Motor (Abd Dig Minimi)   Wrist    2.8 <3.1 7.0 >7.0  Wrist Abd Dig Minimi 8.0 29   B Elbow    6.5  5.2   B Elbow Wrist 23.0 62   A Elbow    7.5  10.0   A Elbow B Elbow 10.0 100     Comparison Summary Table     Site NR Peak (ms) P-T Amp (µV) Site1 Site2 Dist (cm) Delta-0 (ms)   Left Median/Ulnar Dig IV Comparison (Digit 4)   Median Wr    4.0 25.0 Median Wr Ulnar Wr 0.0 1.3   Ulnar Wr    3.0 46.9       Right Median/Ulnar Dig IV Comparison (Digit 4)   Median Wr    NR  Median Wr Ulnar Wr     Ulnar Wr    3.1 33.8         F Wave Studies     NR F-Lat (ms) Lat Norm (ms) L-R F-Lat (ms) L-R Lat Norm   Right Median (Mrkrs) (Abd Poll Brev)      28.82 <31  <2.2       Waveforms:

## 2022-03-04 ENCOUNTER — TRANSCRIBE ORDER (OUTPATIENT)
Dept: SCHEDULING | Age: 52
End: 2022-03-04

## 2022-03-04 DIAGNOSIS — M47.816 LUMBAR SPONDYLOSIS: ICD-10-CM

## 2022-03-04 DIAGNOSIS — G95.19 NEUROGENIC CLAUDICATION (HCC): Primary | ICD-10-CM

## 2022-03-04 DIAGNOSIS — Z98.1 HISTORY OF LUMBAR FUSION: ICD-10-CM

## 2022-03-11 ENCOUNTER — HOSPITAL ENCOUNTER (OUTPATIENT)
Dept: MRI IMAGING | Age: 52
Discharge: HOME | End: 2022-03-11
Payer: COMMERCIAL

## 2022-03-11 DIAGNOSIS — G95.19 NEUROGENIC CLAUDICATION (HCC): ICD-10-CM

## 2022-03-11 DIAGNOSIS — Z98.1 HISTORY OF LUMBAR FUSION: ICD-10-CM

## 2022-03-11 DIAGNOSIS — M47.816 LUMBAR SPONDYLOSIS: ICD-10-CM

## 2022-03-11 PROCEDURE — 72148 MRI LUMBAR SPINE W/O DYE: CPT | Performed by: PHYSICAL MEDICINE & REHABILITATION

## 2022-03-18 PROBLEM — G45.9 TIA (TRANSIENT ISCHEMIC ATTACK): Status: ACTIVE | Noted: 2019-01-01

## 2022-10-04 ENCOUNTER — OFFICE VISIT (OUTPATIENT)
Dept: URGENT CARE | Age: 52
End: 2022-10-04
Payer: COMMERCIAL

## 2022-10-04 VITALS — HEART RATE: 72 BPM | TEMPERATURE: 98.4 F | RESPIRATION RATE: 16 BRPM | OXYGEN SATURATION: 96 %

## 2022-10-04 DIAGNOSIS — Z20.822 COVID-19 RULED OUT BY LABORATORY TESTING: Primary | ICD-10-CM

## 2022-10-04 LAB — SARS-COV-2 PCR, POC: NEGATIVE

## 2022-12-15 ENCOUNTER — TRANSCRIBE ORDER (OUTPATIENT)
Dept: SCHEDULING | Age: 52
End: 2022-12-15

## 2022-12-15 DIAGNOSIS — M54.50 LUMBAR PAIN: ICD-10-CM

## 2022-12-15 DIAGNOSIS — G95.19 NEUROGENIC CLAUDICATION (HCC): ICD-10-CM

## 2022-12-15 DIAGNOSIS — Z98.1 HISTORY OF LUMBAR FUSION: Primary | ICD-10-CM

## 2022-12-15 DIAGNOSIS — M54.16 LUMBAR RADICULITIS: ICD-10-CM

## 2022-12-15 DIAGNOSIS — M48.061 DEGENERATIVE LUMBAR SPINAL STENOSIS: ICD-10-CM

## 2022-12-15 DIAGNOSIS — M47.816 LUMBAR SPONDYLOSIS: ICD-10-CM

## 2023-08-13 ENCOUNTER — APPOINTMENT (OUTPATIENT)
Facility: HOSPITAL | Age: 53
DRG: 312 | End: 2023-08-13
Payer: COMMERCIAL

## 2023-08-13 ENCOUNTER — HOSPITAL ENCOUNTER (INPATIENT)
Facility: HOSPITAL | Age: 53
LOS: 2 days | Discharge: HOME HEALTH | DRG: 312 | End: 2023-08-15
Attending: EMERGENCY MEDICINE | Admitting: INTERNAL MEDICINE
Payer: COMMERCIAL

## 2023-08-13 DIAGNOSIS — E87.6 HYPOKALEMIA: ICD-10-CM

## 2023-08-13 DIAGNOSIS — G89.29 CHRONIC MIDLINE LOW BACK PAIN WITHOUT SCIATICA: ICD-10-CM

## 2023-08-13 DIAGNOSIS — G45.9 TIA (TRANSIENT ISCHEMIC ATTACK): ICD-10-CM

## 2023-08-13 DIAGNOSIS — R55 SYNCOPE AND COLLAPSE: Primary | ICD-10-CM

## 2023-08-13 DIAGNOSIS — M54.50 CHRONIC MIDLINE LOW BACK PAIN WITHOUT SCIATICA: ICD-10-CM

## 2023-08-13 LAB
ALBUMIN SERPL-MCNC: 2.9 G/DL (ref 3.5–5)
ALBUMIN/GLOB SERPL: 0.7 (ref 1.1–2.2)
ALP SERPL-CCNC: 125 U/L (ref 45–117)
ALT SERPL-CCNC: 22 U/L (ref 12–78)
ANION GAP BLD CALC-SCNC: 12 (ref 10–20)
ANION GAP SERPL CALC-SCNC: 8 MMOL/L (ref 5–15)
AST SERPL-CCNC: 9 U/L (ref 15–37)
BASE EXCESS BLD CALC-SCNC: 5.6 MMOL/L
BASOPHILS # BLD: 0 K/UL (ref 0–0.1)
BASOPHILS NFR BLD: 0 % (ref 0–1)
BILIRUB SERPL-MCNC: 0.3 MG/DL (ref 0.2–1)
BUN SERPL-MCNC: 10 MG/DL (ref 6–20)
BUN/CREAT SERPL: 12 (ref 12–20)
CA-I BLD-MCNC: 1.14 MMOL/L (ref 1.12–1.32)
CALCIUM SERPL-MCNC: 8.4 MG/DL (ref 8.5–10.1)
CHLORIDE BLD-SCNC: 107 MMOL/L (ref 100–108)
CHLORIDE SERPL-SCNC: 108 MMOL/L (ref 97–108)
CK SERPL-CCNC: 30 U/L (ref 26–192)
CO2 BLD-SCNC: 27 MMOL/L (ref 19–24)
CO2 SERPL-SCNC: 26 MMOL/L (ref 21–32)
COMMENT:: NORMAL
CREAT SERPL-MCNC: 0.82 MG/DL (ref 0.55–1.02)
CREAT UR-MCNC: 0.6 MG/DL (ref 0.6–1.3)
DIFFERENTIAL METHOD BLD: ABNORMAL
EOSINOPHIL # BLD: 0.2 K/UL (ref 0–0.4)
EOSINOPHIL NFR BLD: 2 % (ref 0–7)
ERYTHROCYTE [DISTWIDTH] IN BLOOD BY AUTOMATED COUNT: 22.5 % (ref 11.5–14.5)
ERYTHROCYTE [SEDIMENTATION RATE] IN BLOOD: 61 MM/HR (ref 0–30)
GLOBULIN SER CALC-MCNC: 4.1 G/DL (ref 2–4)
GLUCOSE BLD STRIP.AUTO-MCNC: 109 MG/DL (ref 65–117)
GLUCOSE BLD STRIP.AUTO-MCNC: 109 MG/DL (ref 74–106)
GLUCOSE SERPL-MCNC: 113 MG/DL (ref 65–100)
HCO3 BLDA-SCNC: 27 MMOL/L
HCT VFR BLD AUTO: 28.5 % (ref 35–47)
HGB BLD-MCNC: 9 G/DL (ref 11.5–16)
IMM GRANULOCYTES # BLD AUTO: 0.1 K/UL (ref 0–0.04)
IMM GRANULOCYTES NFR BLD AUTO: 1 % (ref 0–0.5)
LACTATE BLD-SCNC: 1.23 MMOL/L (ref 0.4–2)
LACTATE BLD-SCNC: 3.57 MMOL/L (ref 0.4–2)
LYMPHOCYTES # BLD: 2.4 K/UL (ref 0.8–3.5)
LYMPHOCYTES NFR BLD: 23 % (ref 12–49)
MAGNESIUM SERPL-MCNC: 1.9 MG/DL (ref 1.6–2.4)
MCH RBC QN AUTO: 27 PG (ref 26–34)
MCHC RBC AUTO-ENTMCNC: 31.6 G/DL (ref 30–36.5)
MCV RBC AUTO: 85.6 FL (ref 80–99)
MONOCYTES # BLD: 1.2 K/UL (ref 0–1)
MONOCYTES NFR BLD: 11 % (ref 5–13)
NEUTS SEG # BLD: 6.6 K/UL (ref 1.8–8)
NEUTS SEG NFR BLD: 63 % (ref 32–75)
NRBC # BLD: 0.03 K/UL (ref 0–0.01)
NRBC BLD-RTO: 0.3 PER 100 WBC
PCO2 BLDV: 30.3 MMHG (ref 41–51)
PH BLDV: 7.56 (ref 7.32–7.42)
PHOSPHATE SERPL-MCNC: 2.2 MG/DL (ref 2.6–4.7)
PLATELET # BLD AUTO: 298 K/UL (ref 150–400)
PMV BLD AUTO: 10.3 FL (ref 8.9–12.9)
PO2 BLDV: 58 MMHG (ref 25–40)
POTASSIUM BLD-SCNC: 2.5 MMOL/L (ref 3.5–5.5)
POTASSIUM SERPL-SCNC: 2.5 MMOL/L (ref 3.5–5.1)
PROT SERPL-MCNC: 7 G/DL (ref 6.4–8.2)
RBC # BLD AUTO: 3.33 M/UL (ref 3.8–5.2)
RBC MORPH BLD: ABNORMAL
SAO2 % BLD: 94 %
SERVICE CMNT-IMP: ABNORMAL
SERVICE CMNT-IMP: NORMAL
SODIUM BLD-SCNC: 146 MMOL/L (ref 136–145)
SODIUM SERPL-SCNC: 142 MMOL/L (ref 136–145)
SPECIMEN HOLD: NORMAL
SPECIMEN SITE: ABNORMAL
TROPONIN I SERPL HS-MCNC: 5 NG/L (ref 0–51)
WBC # BLD AUTO: 10.5 K/UL (ref 3.6–11)

## 2023-08-13 PROCEDURE — 94762 N-INVAS EAR/PLS OXIMTRY CONT: CPT

## 2023-08-13 PROCEDURE — 71045 X-RAY EXAM CHEST 1 VIEW: CPT

## 2023-08-13 PROCEDURE — 02HV33Z INSERTION OF INFUSION DEVICE INTO SUPERIOR VENA CAVA, PERCUTANEOUS APPROACH: ICD-10-PCS | Performed by: INTERNAL MEDICINE

## 2023-08-13 PROCEDURE — 99285 EMERGENCY DEPT VISIT HI MDM: CPT

## 2023-08-13 PROCEDURE — 82330 ASSAY OF CALCIUM: CPT

## 2023-08-13 PROCEDURE — 36415 COLL VENOUS BLD VENIPUNCTURE: CPT

## 2023-08-13 PROCEDURE — 6360000002 HC RX W HCPCS: Performed by: INTERNAL MEDICINE

## 2023-08-13 PROCEDURE — 84132 ASSAY OF SERUM POTASSIUM: CPT

## 2023-08-13 PROCEDURE — 84484 ASSAY OF TROPONIN QUANT: CPT

## 2023-08-13 PROCEDURE — 83605 ASSAY OF LACTIC ACID: CPT

## 2023-08-13 PROCEDURE — 2500000003 HC RX 250 WO HCPCS: Performed by: EMERGENCY MEDICINE

## 2023-08-13 PROCEDURE — 84100 ASSAY OF PHOSPHORUS: CPT

## 2023-08-13 PROCEDURE — 80053 COMPREHEN METABOLIC PANEL: CPT

## 2023-08-13 PROCEDURE — 82947 ASSAY GLUCOSE BLOOD QUANT: CPT

## 2023-08-13 PROCEDURE — 83735 ASSAY OF MAGNESIUM: CPT

## 2023-08-13 PROCEDURE — 87040 BLOOD CULTURE FOR BACTERIA: CPT

## 2023-08-13 PROCEDURE — 2580000003 HC RX 258: Performed by: INTERNAL MEDICINE

## 2023-08-13 PROCEDURE — 70450 CT HEAD/BRAIN W/O DYE: CPT

## 2023-08-13 PROCEDURE — 96374 THER/PROPH/DIAG INJ IV PUSH: CPT

## 2023-08-13 PROCEDURE — 1100000003 HC PRIVATE W/ TELEMETRY

## 2023-08-13 PROCEDURE — 84295 ASSAY OF SERUM SODIUM: CPT

## 2023-08-13 PROCEDURE — 85025 COMPLETE CBC W/AUTO DIFF WBC: CPT

## 2023-08-13 PROCEDURE — 6370000000 HC RX 637 (ALT 250 FOR IP): Performed by: EMERGENCY MEDICINE

## 2023-08-13 PROCEDURE — 6370000000 HC RX 637 (ALT 250 FOR IP): Performed by: INTERNAL MEDICINE

## 2023-08-13 PROCEDURE — 6360000002 HC RX W HCPCS: Performed by: EMERGENCY MEDICINE

## 2023-08-13 PROCEDURE — 96375 TX/PRO/DX INJ NEW DRUG ADDON: CPT

## 2023-08-13 PROCEDURE — 93005 ELECTROCARDIOGRAM TRACING: CPT | Performed by: EMERGENCY MEDICINE

## 2023-08-13 PROCEDURE — 82550 ASSAY OF CK (CPK): CPT

## 2023-08-13 PROCEDURE — 85652 RBC SED RATE AUTOMATED: CPT

## 2023-08-13 PROCEDURE — 82803 BLOOD GASES ANY COMBINATION: CPT

## 2023-08-13 PROCEDURE — 82962 GLUCOSE BLOOD TEST: CPT

## 2023-08-13 RX ORDER — ASPIRIN 81 MG/1
81 TABLET, CHEWABLE ORAL DAILY
Status: DISCONTINUED | OUTPATIENT
Start: 2023-08-13 | End: 2023-08-15 | Stop reason: HOSPADM

## 2023-08-13 RX ORDER — ONDANSETRON 2 MG/ML
4 INJECTION INTRAMUSCULAR; INTRAVENOUS ONCE
Status: COMPLETED | OUTPATIENT
Start: 2023-08-13 | End: 2023-08-13

## 2023-08-13 RX ORDER — INSULIN LISPRO 100 [IU]/ML
0-4 INJECTION, SOLUTION INTRAVENOUS; SUBCUTANEOUS NIGHTLY
Status: DISCONTINUED | OUTPATIENT
Start: 2023-08-13 | End: 2023-08-15 | Stop reason: HOSPADM

## 2023-08-13 RX ORDER — ATORVASTATIN CALCIUM 40 MG/1
40 TABLET, FILM COATED ORAL DAILY
Status: DISCONTINUED | OUTPATIENT
Start: 2023-08-13 | End: 2023-08-15 | Stop reason: HOSPADM

## 2023-08-13 RX ORDER — BUPROPION HYDROCHLORIDE 150 MG/1
300 TABLET ORAL DAILY
Status: DISCONTINUED | OUTPATIENT
Start: 2023-08-13 | End: 2023-08-15 | Stop reason: HOSPADM

## 2023-08-13 RX ORDER — POTASSIUM CHLORIDE 7.45 MG/ML
10 INJECTION INTRAVENOUS
Status: COMPLETED | OUTPATIENT
Start: 2023-08-13 | End: 2023-08-13

## 2023-08-13 RX ORDER — SODIUM CHLORIDE 0.9 % (FLUSH) 0.9 %
5-40 SYRINGE (ML) INJECTION EVERY 12 HOURS SCHEDULED
Status: DISCONTINUED | OUTPATIENT
Start: 2023-08-13 | End: 2023-08-15 | Stop reason: HOSPADM

## 2023-08-13 RX ORDER — POLYETHYLENE GLYCOL 3350 17 G/17G
17 POWDER, FOR SOLUTION ORAL DAILY PRN
Status: DISCONTINUED | OUTPATIENT
Start: 2023-08-13 | End: 2023-08-15 | Stop reason: HOSPADM

## 2023-08-13 RX ORDER — ONDANSETRON 4 MG/1
4 TABLET, ORALLY DISINTEGRATING ORAL EVERY 8 HOURS PRN
Status: DISCONTINUED | OUTPATIENT
Start: 2023-08-13 | End: 2023-08-15 | Stop reason: HOSPADM

## 2023-08-13 RX ORDER — MECLIZINE HYDROCHLORIDE 25 MG/1
25 TABLET ORAL EVERY 6 HOURS PRN
Status: DISCONTINUED | OUTPATIENT
Start: 2023-08-13 | End: 2023-08-15 | Stop reason: HOSPADM

## 2023-08-13 RX ORDER — ONDANSETRON 2 MG/ML
4 INJECTION INTRAMUSCULAR; INTRAVENOUS EVERY 6 HOURS PRN
Status: DISCONTINUED | OUTPATIENT
Start: 2023-08-13 | End: 2023-08-15 | Stop reason: HOSPADM

## 2023-08-13 RX ORDER — BUTALBITAL, ACETAMINOPHEN AND CAFFEINE 50; 325; 40 MG/1; MG/1; MG/1
1 TABLET ORAL EVERY 6 HOURS PRN
Status: DISCONTINUED | OUTPATIENT
Start: 2023-08-13 | End: 2023-08-15 | Stop reason: HOSPADM

## 2023-08-13 RX ORDER — INSULIN LISPRO 100 [IU]/ML
0-4 INJECTION, SOLUTION INTRAVENOUS; SUBCUTANEOUS
Status: DISCONTINUED | OUTPATIENT
Start: 2023-08-14 | End: 2023-08-15 | Stop reason: HOSPADM

## 2023-08-13 RX ORDER — GABAPENTIN 300 MG/1
300 CAPSULE ORAL 2 TIMES DAILY
Status: DISCONTINUED | OUTPATIENT
Start: 2023-08-13 | End: 2023-08-15 | Stop reason: HOSPADM

## 2023-08-13 RX ORDER — HYDROMORPHONE HYDROCHLORIDE 1 MG/ML
1 INJECTION, SOLUTION INTRAMUSCULAR; INTRAVENOUS; SUBCUTANEOUS
Status: COMPLETED | OUTPATIENT
Start: 2023-08-13 | End: 2023-08-13

## 2023-08-13 RX ORDER — SODIUM CHLORIDE 9 MG/ML
INJECTION, SOLUTION INTRAVENOUS CONTINUOUS
Status: DISCONTINUED | OUTPATIENT
Start: 2023-08-13 | End: 2023-08-15 | Stop reason: HOSPADM

## 2023-08-13 RX ORDER — ACETAMINOPHEN 325 MG/1
650 TABLET ORAL EVERY 6 HOURS PRN
Status: DISCONTINUED | OUTPATIENT
Start: 2023-08-13 | End: 2023-08-15 | Stop reason: HOSPADM

## 2023-08-13 RX ORDER — SODIUM CHLORIDE 9 MG/ML
INJECTION, SOLUTION INTRAVENOUS PRN
Status: DISCONTINUED | OUTPATIENT
Start: 2023-08-13 | End: 2023-08-15 | Stop reason: HOSPADM

## 2023-08-13 RX ORDER — SODIUM CHLORIDE 0.9 % (FLUSH) 0.9 %
5-40 SYRINGE (ML) INJECTION PRN
Status: DISCONTINUED | OUTPATIENT
Start: 2023-08-13 | End: 2023-08-15 | Stop reason: HOSPADM

## 2023-08-13 RX ORDER — CYCLOBENZAPRINE HCL 10 MG
10 TABLET ORAL 3 TIMES DAILY PRN
Status: DISCONTINUED | OUTPATIENT
Start: 2023-08-13 | End: 2023-08-15 | Stop reason: HOSPADM

## 2023-08-13 RX ORDER — ENOXAPARIN SODIUM 100 MG/ML
30 INJECTION SUBCUTANEOUS 2 TIMES DAILY
Status: DISCONTINUED | OUTPATIENT
Start: 2023-08-13 | End: 2023-08-15 | Stop reason: HOSPADM

## 2023-08-13 RX ORDER — ACETAMINOPHEN 650 MG/1
650 SUPPOSITORY RECTAL EVERY 6 HOURS PRN
Status: DISCONTINUED | OUTPATIENT
Start: 2023-08-13 | End: 2023-08-15 | Stop reason: HOSPADM

## 2023-08-13 RX ADMIN — POTASSIUM CHLORIDE 10 MEQ: 7.46 INJECTION, SOLUTION INTRAVENOUS at 21:03

## 2023-08-13 RX ADMIN — SODIUM CHLORIDE: 9 INJECTION, SOLUTION INTRAVENOUS at 21:03

## 2023-08-13 RX ADMIN — ASPIRIN 81 MG: 81 TABLET, CHEWABLE ORAL at 21:05

## 2023-08-13 RX ADMIN — POTASSIUM CHLORIDE 10 MEQ: 7.46 INJECTION, SOLUTION INTRAVENOUS at 20:09

## 2023-08-13 RX ADMIN — POTASSIUM BICARBONATE 20 MEQ: 782 TABLET, EFFERVESCENT ORAL at 18:34

## 2023-08-13 RX ADMIN — HYDROMORPHONE HYDROCHLORIDE 1 MG: 1 INJECTION, SOLUTION INTRAMUSCULAR; INTRAVENOUS; SUBCUTANEOUS at 19:35

## 2023-08-13 RX ADMIN — SODIUM CHLORIDE, PRESERVATIVE FREE 10 ML: 5 INJECTION INTRAVENOUS at 21:15

## 2023-08-13 RX ADMIN — ONDANSETRON 4 MG: 2 INJECTION INTRAMUSCULAR; INTRAVENOUS at 19:36

## 2023-08-13 RX ADMIN — ENOXAPARIN SODIUM 30 MG: 100 INJECTION SUBCUTANEOUS at 21:04

## 2023-08-13 RX ADMIN — POTASSIUM CHLORIDE 10 MEQ: 7.46 INJECTION, SOLUTION INTRAVENOUS at 21:11

## 2023-08-13 RX ADMIN — GABAPENTIN 300 MG: 300 CAPSULE ORAL at 21:05

## 2023-08-13 RX ADMIN — POTASSIUM CHLORIDE 10 MEQ: 7.46 INJECTION, SOLUTION INTRAVENOUS at 18:34

## 2023-08-13 RX ADMIN — BUPROPION HYDROCHLORIDE 300 MG: 150 TABLET, EXTENDED RELEASE ORAL at 21:05

## 2023-08-13 RX ADMIN — ATORVASTATIN CALCIUM 40 MG: 40 TABLET, FILM COATED ORAL at 21:05

## 2023-08-13 ASSESSMENT — LIFESTYLE VARIABLES
HOW OFTEN DO YOU HAVE A DRINK CONTAINING ALCOHOL: NEVER
HOW MANY STANDARD DRINKS CONTAINING ALCOHOL DO YOU HAVE ON A TYPICAL DAY: PATIENT DOES NOT DRINK

## 2023-08-13 ASSESSMENT — PAIN SCALES - GENERAL
PAINLEVEL_OUTOF10: 9
PAINLEVEL_OUTOF10: 7
PAINLEVEL_OUTOF10: 10

## 2023-08-13 ASSESSMENT — PAIN DESCRIPTION - LOCATION
LOCATION: BACK
LOCATION: BACK

## 2023-08-13 ASSESSMENT — PAIN DESCRIPTION - ORIENTATION
ORIENTATION: LOWER
ORIENTATION: LOWER

## 2023-08-13 NOTE — H&P
Pulmonary vasculature is not engorged. There are no focal parenchymal opacities, effusions, or pneumothorax. 1. No complicating features post PICC line placement       _______________________________________________________________________    TOTAL TIME:  76 Minutes    Critical Care Provided     Minutes non procedure based    Signed: Thor Kapoor MD    Procedures: see electronic medical records for all procedures/Xrays and details which were not copied into this note but were reviewed prior to creation of Plan.

## 2023-08-13 NOTE — ED NOTES
Per stepmother at bedside, pt had two back to back possible \"seizures. \" Pt stepmother states, pt was sitting in chair when the pt eye's rolled in the back of her head and began having grand-mal like movements, and became unresponsive. Pt stepmother states the first possible seizure lasted around 2 or less minutes. Per stepmother after pt had her first possible seizure, the [facility] became [facility]. This second possible seizure lasted approx. 2 or less minutes again, when pt eye's rolled in back of head, began having tonic clonic like movements. Per family after both \"seizures\", but woke up and was immediately back to baseline, but pt stated to step mother Vanessa Inks just happened. \"      Theodore Al RN  08/13/23 [id number]

## 2023-08-14 ENCOUNTER — APPOINTMENT (OUTPATIENT)
Facility: HOSPITAL | Age: 53
DRG: 312 | End: 2023-08-14
Attending: PSYCHIATRY & NEUROLOGY
Payer: COMMERCIAL

## 2023-08-14 ENCOUNTER — APPOINTMENT (OUTPATIENT)
Facility: HOSPITAL | Age: 53
DRG: 312 | End: 2023-08-14
Payer: COMMERCIAL

## 2023-08-14 PROBLEM — I65.23 BILATERAL CAROTID ARTERY STENOSIS: Status: ACTIVE | Noted: 2023-08-14

## 2023-08-14 PROBLEM — M96.1 LUMBAR POST-LAMINECTOMY SYNDROME: Status: ACTIVE | Noted: 2023-08-14

## 2023-08-14 PROBLEM — E11.42 DIABETIC PERIPHERAL NEUROPATHY ASSOCIATED WITH TYPE 2 DIABETES MELLITUS (HCC): Status: ACTIVE | Noted: 2023-08-14

## 2023-08-14 PROBLEM — R41.82 ACUTE ALTERATION IN MENTAL STATUS: Status: ACTIVE | Noted: 2023-08-14

## 2023-08-14 PROBLEM — R55 CONVULSIVE SYNCOPE: Status: ACTIVE | Noted: 2023-08-14

## 2023-08-14 LAB
ANION GAP SERPL CALC-SCNC: 5 MMOL/L (ref 5–15)
BUN SERPL-MCNC: 8 MG/DL (ref 6–20)
BUN/CREAT SERPL: 13 (ref 12–20)
CALCIUM SERPL-MCNC: 8.6 MG/DL (ref 8.5–10.1)
CHLORIDE SERPL-SCNC: 109 MMOL/L (ref 97–108)
CO2 SERPL-SCNC: 28 MMOL/L (ref 21–32)
CREAT SERPL-MCNC: 0.62 MG/DL (ref 0.55–1.02)
ECHO BSA: 2.22 M2
EKG ATRIAL RATE: 82 BPM
EKG DIAGNOSIS: NORMAL
EKG P AXIS: 23 DEGREES
EKG P-R INTERVAL: 144 MS
EKG Q-T INTERVAL: 396 MS
EKG QRS DURATION: 84 MS
EKG QTC CALCULATION (BAZETT): 462 MS
EKG R AXIS: 21 DEGREES
EKG T AXIS: 34 DEGREES
EKG VENTRICULAR RATE: 82 BPM
ERYTHROCYTE [DISTWIDTH] IN BLOOD BY AUTOMATED COUNT: 22.9 % (ref 11.5–14.5)
GLUCOSE BLD STRIP.AUTO-MCNC: 102 MG/DL (ref 65–117)
GLUCOSE BLD STRIP.AUTO-MCNC: 120 MG/DL (ref 65–117)
GLUCOSE BLD STRIP.AUTO-MCNC: 120 MG/DL (ref 65–117)
GLUCOSE BLD STRIP.AUTO-MCNC: 98 MG/DL (ref 65–117)
GLUCOSE SERPL-MCNC: 103 MG/DL (ref 65–100)
HCT VFR BLD AUTO: 27 % (ref 35–47)
HGB BLD-MCNC: 8.4 G/DL (ref 11.5–16)
MAGNESIUM SERPL-MCNC: 2.1 MG/DL (ref 1.6–2.4)
MCH RBC QN AUTO: 26.7 PG (ref 26–34)
MCHC RBC AUTO-ENTMCNC: 31.1 G/DL (ref 30–36.5)
MCV RBC AUTO: 85.7 FL (ref 80–99)
NRBC # BLD: 0 K/UL (ref 0–0.01)
NRBC BLD-RTO: 0 PER 100 WBC
PHOSPHATE SERPL-MCNC: 3.1 MG/DL (ref 2.6–4.7)
PLATELET # BLD AUTO: 284 K/UL (ref 150–400)
PMV BLD AUTO: 10 FL (ref 8.9–12.9)
POTASSIUM SERPL-SCNC: 3.2 MMOL/L (ref 3.5–5.1)
RBC # BLD AUTO: 3.15 M/UL (ref 3.8–5.2)
SERVICE CMNT-IMP: ABNORMAL
SERVICE CMNT-IMP: ABNORMAL
SERVICE CMNT-IMP: NORMAL
SERVICE CMNT-IMP: NORMAL
SODIUM SERPL-SCNC: 142 MMOL/L (ref 136–145)
VAS LEFT CCA DIST EDV: 32.7 CM/S
VAS LEFT CCA DIST PSV: 111.7 CM/S
VAS LEFT CCA PROX EDV: 21.7 CM/S
VAS LEFT CCA PROX PSV: 138 CM/S
VAS LEFT ECA EDV: 8.6 CM/S
VAS LEFT ECA PSV: 116.1 CM/S
VAS LEFT ICA DIST EDV: 30.5 CM/S
VAS LEFT ICA DIST PSV: 107.3 CM/S
VAS LEFT ICA MID EDV: 34.9 CM/S
VAS LEFT ICA MID PSV: 116.1 CM/S
VAS LEFT ICA PROX EDV: 20 CM/S
VAS LEFT ICA PROX PSV: 109.7 CM/S
VAS LEFT ICA/CCA PSV: 1 NO UNITS
VAS LEFT SUBCLAVIAN PROX EDV: 8.6 CM/S
VAS LEFT SUBCLAVIAN PROX PSV: 155.6 CM/S
VAS LEFT VERTEBRAL EDV: 17.3 CM/S
VAS LEFT VERTEBRAL PSV: 45.9 CM/S
VAS RIGHT CCA DIST EDV: 32.7 CM/S
VAS RIGHT CCA DIST PSV: 113.9 CM/S
VAS RIGHT CCA PROX EDV: 30.5 CM/S
VAS RIGHT CCA PROX PSV: 116.1 CM/S
VAS RIGHT ECA EDV: 28.3 CM/S
VAS RIGHT ECA PSV: 111.7 CM/S
VAS RIGHT ICA DIST EDV: 36.2 CM/S
VAS RIGHT ICA DIST PSV: 88.3 CM/S
VAS RIGHT ICA MID EDV: 35 CM/S
VAS RIGHT ICA MID PSV: 119.9 CM/S
VAS RIGHT ICA PROX EDV: 21.7 CM/S
VAS RIGHT ICA PROX PSV: 83.2 CM/S
VAS RIGHT ICA/CCA PSV: 1.1 NO UNITS
VAS RIGHT SUBCLAVIAN PROX EDV: 15.1 CM/S
VAS RIGHT SUBCLAVIAN PROX PSV: 162.2 CM/S
VAS RIGHT VERTEBRAL EDV: 21.7 CM/S
VAS RIGHT VERTEBRAL PSV: 50.3 CM/S
WBC # BLD AUTO: 10.8 K/UL (ref 3.6–11)

## 2023-08-14 PROCEDURE — 6360000002 HC RX W HCPCS: Performed by: INTERNAL MEDICINE

## 2023-08-14 PROCEDURE — 93880 EXTRACRANIAL BILAT STUDY: CPT | Performed by: PSYCHIATRY & NEUROLOGY

## 2023-08-14 PROCEDURE — 1100000003 HC PRIVATE W/ TELEMETRY

## 2023-08-14 PROCEDURE — 99222 1ST HOSP IP/OBS MODERATE 55: CPT | Performed by: PSYCHIATRY & NEUROLOGY

## 2023-08-14 PROCEDURE — 93880 EXTRACRANIAL BILAT STUDY: CPT

## 2023-08-14 PROCEDURE — A9579 GAD-BASE MR CONTRAST NOS,1ML: HCPCS | Performed by: PSYCHIATRY & NEUROLOGY

## 2023-08-14 PROCEDURE — 85027 COMPLETE CBC AUTOMATED: CPT

## 2023-08-14 PROCEDURE — 2500000003 HC RX 250 WO HCPCS: Performed by: NURSE PRACTITIONER

## 2023-08-14 PROCEDURE — 36415 COLL VENOUS BLD VENIPUNCTURE: CPT

## 2023-08-14 PROCEDURE — 83735 ASSAY OF MAGNESIUM: CPT

## 2023-08-14 PROCEDURE — 95819 EEG AWAKE AND ASLEEP: CPT

## 2023-08-14 PROCEDURE — 6370000000 HC RX 637 (ALT 250 FOR IP): Performed by: INTERNAL MEDICINE

## 2023-08-14 PROCEDURE — 95819 EEG AWAKE AND ASLEEP: CPT | Performed by: PSYCHIATRY & NEUROLOGY

## 2023-08-14 PROCEDURE — 80048 BASIC METABOLIC PNL TOTAL CA: CPT

## 2023-08-14 PROCEDURE — 6360000004 HC RX CONTRAST MEDICATION: Performed by: PSYCHIATRY & NEUROLOGY

## 2023-08-14 PROCEDURE — 2580000003 HC RX 258: Performed by: INTERNAL MEDICINE

## 2023-08-14 PROCEDURE — 70553 MRI BRAIN STEM W/O & W/DYE: CPT

## 2023-08-14 PROCEDURE — 6360000002 HC RX W HCPCS: Performed by: NURSE PRACTITIONER

## 2023-08-14 PROCEDURE — 84100 ASSAY OF PHOSPHORUS: CPT

## 2023-08-14 PROCEDURE — 82962 GLUCOSE BLOOD TEST: CPT

## 2023-08-14 PROCEDURE — 6370000000 HC RX 637 (ALT 250 FOR IP): Performed by: STUDENT IN AN ORGANIZED HEALTH CARE EDUCATION/TRAINING PROGRAM

## 2023-08-14 RX ORDER — LORAZEPAM 1 MG/1
1 TABLET ORAL ONCE
Status: COMPLETED | OUTPATIENT
Start: 2023-08-14 | End: 2023-08-14

## 2023-08-14 RX ORDER — DEXTROSE MONOHYDRATE 100 MG/ML
INJECTION, SOLUTION INTRAVENOUS CONTINUOUS PRN
Status: DISCONTINUED | OUTPATIENT
Start: 2023-08-14 | End: 2023-08-15 | Stop reason: HOSPADM

## 2023-08-14 RX ORDER — HYDROMORPHONE HYDROCHLORIDE 1 MG/ML
0.5 INJECTION, SOLUTION INTRAMUSCULAR; INTRAVENOUS; SUBCUTANEOUS EVERY 4 HOURS PRN
Status: DISCONTINUED | OUTPATIENT
Start: 2023-08-14 | End: 2023-08-15 | Stop reason: HOSPADM

## 2023-08-14 RX ORDER — POTASSIUM CHLORIDE 20 MEQ/1
40 TABLET, EXTENDED RELEASE ORAL ONCE
Status: COMPLETED | OUTPATIENT
Start: 2023-08-14 | End: 2023-08-14

## 2023-08-14 RX ADMIN — GABAPENTIN 300 MG: 300 CAPSULE ORAL at 20:47

## 2023-08-14 RX ADMIN — GABAPENTIN 300 MG: 300 CAPSULE ORAL at 10:00

## 2023-08-14 RX ADMIN — ASPIRIN 81 MG: 81 TABLET, CHEWABLE ORAL at 10:01

## 2023-08-14 RX ADMIN — SODIUM CHLORIDE: 9 INJECTION, SOLUTION INTRAVENOUS at 00:42

## 2023-08-14 RX ADMIN — SODIUM CHLORIDE, PRESERVATIVE FREE 10 ML: 5 INJECTION INTRAVENOUS at 09:53

## 2023-08-14 RX ADMIN — CYCLOBENZAPRINE 10 MG: 10 TABLET, FILM COATED ORAL at 00:42

## 2023-08-14 RX ADMIN — SODIUM CHLORIDE, PRESERVATIVE FREE 10 ML: 5 INJECTION INTRAVENOUS at 20:47

## 2023-08-14 RX ADMIN — BUPROPION HYDROCHLORIDE 300 MG: 150 TABLET, EXTENDED RELEASE ORAL at 09:59

## 2023-08-14 RX ADMIN — POTASSIUM CHLORIDE 40 MEQ: 1500 TABLET, EXTENDED RELEASE ORAL at 10:00

## 2023-08-14 RX ADMIN — MEROPENEM 1000 MG: 1 INJECTION, POWDER, FOR SOLUTION INTRAVENOUS at 09:58

## 2023-08-14 RX ADMIN — HYDROMORPHONE HYDROCHLORIDE 0.5 MG: 1 INJECTION, SOLUTION INTRAMUSCULAR; INTRAVENOUS; SUBCUTANEOUS at 00:41

## 2023-08-14 RX ADMIN — HYDROMORPHONE HYDROCHLORIDE 0.5 MG: 1 INJECTION, SOLUTION INTRAMUSCULAR; INTRAVENOUS; SUBCUTANEOUS at 20:46

## 2023-08-14 RX ADMIN — MEROPENEM 1000 MG: 1 INJECTION, POWDER, FOR SOLUTION INTRAVENOUS at 17:24

## 2023-08-14 RX ADMIN — ATORVASTATIN CALCIUM 40 MG: 40 TABLET, FILM COATED ORAL at 10:01

## 2023-08-14 RX ADMIN — LORAZEPAM 1 MG: 1 TABLET ORAL at 17:45

## 2023-08-14 RX ADMIN — GADOTERIDOL 20 ML: 279.3 INJECTION, SOLUTION INTRAVENOUS at 19:46

## 2023-08-14 RX ADMIN — ENOXAPARIN SODIUM 30 MG: 100 INJECTION SUBCUTANEOUS at 20:46

## 2023-08-14 RX ADMIN — HYDROMORPHONE HYDROCHLORIDE 0.5 MG: 1 INJECTION, SOLUTION INTRAMUSCULAR; INTRAVENOUS; SUBCUTANEOUS at 09:51

## 2023-08-14 RX ADMIN — SODIUM CHLORIDE: 9 INJECTION, SOLUTION INTRAVENOUS at 15:34

## 2023-08-14 RX ADMIN — ENOXAPARIN SODIUM 30 MG: 100 INJECTION SUBCUTANEOUS at 10:00

## 2023-08-14 ASSESSMENT — PAIN DESCRIPTION - DESCRIPTORS
DESCRIPTORS: ACHING
DESCRIPTORS: ACHING

## 2023-08-14 ASSESSMENT — PAIN DESCRIPTION - LOCATION
LOCATION: BACK

## 2023-08-14 ASSESSMENT — PAIN DESCRIPTION - ORIENTATION: ORIENTATION: LOWER

## 2023-08-14 ASSESSMENT — PAIN SCALES - GENERAL
PAINLEVEL_OUTOF10: 7
PAINLEVEL_OUTOF10: 0
PAINLEVEL_OUTOF10: 8
PAINLEVEL_OUTOF10: 10
PAINLEVEL_OUTOF10: 5

## 2023-08-14 NOTE — ED PROVIDER NOTES
4.1 (H) 2.0 - 4.0 g/dL    Albumin/Globulin Ratio 0.7 (L) 1.1 - 2.2     Extra Tubes Hold    Collection Time: 08/13/23  4:19 PM   Result Value Ref Range    Specimen HOld BLUE,RED,SST,PST     Comment:        Add-on orders for these samples will be processed based on acceptable specimen integrity and analyte stability, which may vary by analyte. Magnesium    Collection Time: 08/13/23  4:19 PM   Result Value Ref Range    Magnesium 1.9 1.6 - 2.4 mg/dL   Troponin    Collection Time: 08/13/23  4:19 PM   Result Value Ref Range    Troponin, High Sensitivity 5 0 - 51 ng/L   CK    Collection Time: 08/13/23  4:19 PM   Result Value Ref Range    Total CK 30 26 - 192 U/L   Phosphorus    Collection Time: 08/13/23  4:19 PM   Result Value Ref Range    Phosphorus 2.2 (L) 2.6 - 4.7 MG/DL   POCT Blood Gas & Electrolytes    Collection Time: 08/13/23  4:24 PM   Result Value Ref Range    PH, VENOUS (POC) 7.56 (HH) 7.32 - 7.42      PCO2, Amanda, POC 30.3 (L) 41 - 51 MMHG    PO2, VENOUS (POC) 58 (H) 25 - 40 mmHg    HCO3, Arterial 27 mmol/L    Base Excess 5.6 mmol/L    POC O2 SAT 94 %    POC Sodium 146 (H) 136 - 145 MMOL/L    POC Potassium 2.5 (LL) 3.5 - 5.5 MMOL/L    POC Chloride 107 100 - 108 MMOL/L    POC TCO2 27 (H) 19 - 24 MMOL/L    Anion Gap, POC 12 10 - 20      POC Glucose 109 (H) 74 - 106 MG/DL    POC Creatinine 0.6 0.6 - 1.3 MG/DL    eGFR, POC >60 >60 ml/min/1.73m2    POC Ionized Calcium 1.14 1.12 - 1.32 mmol/L    POC Lactic Acid 3.57 (HH) 0.40 - 2.00 mmol/L    Source VENOUS BLOOD      Critical Value Read Back TERMEER    Sedimentation Rate    Collection Time: 08/13/23  5:02 PM   Result Value Ref Range    Sed Rate, Automated 61 (H) 0 - 30 mm/hr   POCT Glucose    Collection Time: 08/13/23  9:00 PM   Result Value Ref Range    POC Glucose 109 65 - 117 mg/dL    Performed by: Aura Dexter RN    POC Lactic Acid    Collection Time: 08/13/23 10:09 PM   Result Value Ref Range    POC Lactic Acid 1.23 0.40 - 2.00 mmol/L       EKG:  If performed,

## 2023-08-14 NOTE — CONSULTS
Consult  REFERRED BY:  Karol Menard MD    CHIEF COMPLAINT: Patient with sudden blacking out spell that occurred when she was getting dressed up by her stepmother and slumped over      Subjective:     Cori Lazaro is a 47 y.o. right-handed -American female we are seeing as a new patient, at the request of the hospitalist, for evaluation of new problem of patient having sudden episode of passing out after she was getting bathed and dressed by her stepmother yesterday and had just had a redo back operation about a week ago and first back surgery about a month ago. Patient had a slight tremor of 1 hand but no major seizure activity, no tongue biting and no incontinence. She is on Wellbutrin. Milligrams a day aspirin Tenoretic Lasix anti-inflammatories Antivert for dizziness, Lasix gabapentin and Fioricet CTA of the head and neck done in 2019 showed no large vessel occlusion and MRI was negative for stroke when she had a TIA. She denies any unusual chest pain or palpitations with this event. She said she felt lethargic prior to the event but does remember much else. She had no focal neurologic deficits before or after either. She is back to her normal baseline now and has no neurologic deficit, and her EEG this morning was perfectly normal and her Dopplers are pending and so is her MRI scan. Patient can ambulate with a rollator and a back brace. She has had 2 back operations and seems to be fused from about L2-L4 5. She is still recovering from the therapy and the second operation. She is never had a seizure before, had a history of a TIA back in 2019 but no stroke on MRI scan. She is diabetic with diabetic neuropathy.       Past Medical History:   Diagnosis Date    Constipation     Essential hypertension 2010    Headache     Hypertension     TED (obstructive sleep apnea)     Stroke [facility])     TIA (transient ischemic attack) 2011 2012      Past Surgical History:   Procedure Laterality Date

## 2023-08-14 NOTE — PLAN OF CARE
Problem: Discharge Planning  Goal: Discharge to home or other facility with appropriate resources  8/14/2023 1444 by Gildarod Ashford RN  Outcome: Progressing  8/14/2023 0121 by Sintia Marcos RN  Outcome: Progressing     Problem: Pain  Goal: Verbalizes/displays adequate comfort level or baseline comfort level  8/14/2023 1444 by Gildardo Ashford RN  Outcome: Progressing  8/14/2023 0121 by Sintia Marcos RN  Outcome: Progressing     Problem: Safety - Adult  Goal: Free from fall injury  8/14/2023 1444 by Gildardo Ashford RN  Outcome: Progressing  8/14/2023 0121 by Sintia Marcos RN  Outcome: Progressing     Problem: ABCDS Injury Assessment  Goal: Absence of physical injury  8/14/2023 1444 by Gildardo Ashford RN  Outcome: Progressing  8/14/2023 0121 by Sintia Marcos RN  Outcome: Progressing     Problem: Neurosensory - Adult  Goal: Achieves stable or improved neurological status  8/14/2023 1444 by Gildardo Ashford RN  Outcome: Progressing  8/14/2023 0121 by Sintia Marcos RN  Outcome: Progressing  Goal: Absence of seizures  8/14/2023 1444 by Gildardo Ashford RN  Outcome: Progressing  8/14/2023 0121 by Sintia Marcos RN  Outcome: Progressing  Goal: Remains free of injury related to seizures activity  8/14/2023 1444 by Gildardo Ashford RN  Outcome: Progressing  8/14/2023 0121 by Sintia Marcos RN  Outcome: Progressing  Goal: Achieves maximal functionality and self care  8/14/2023 1444 by Gildardo Ashford RN  Outcome: Progressing  8/14/2023 0121 by Sintia Marcos RN  Outcome: Progressing     Problem: Skin/Tissue Integrity - Adult  Goal: Incisions, wounds, or drain sites healing without S/S of infection  8/14/2023 1444 by Gildardo Ashford RN  Outcome: Progressing  8/14/2023 0121 by Sintia Marcos RN  Outcome: Progressing     Problem: Hematologic - Adult  Goal: Maintains hematologic stability  8/14/2023 1444 by Gildardo Ashford RN  Outcome: Progressing  8/14/2023 0121 by Sintia Marcos RN  Outcome: Progressing

## 2023-08-15 VITALS
BODY MASS INDEX: 47.74 KG/M2 | DIASTOLIC BLOOD PRESSURE: 68 MMHG | OXYGEN SATURATION: 94 % | HEART RATE: 86 BPM | HEIGHT: 61 IN | SYSTOLIC BLOOD PRESSURE: 120 MMHG | RESPIRATION RATE: 18 BRPM | TEMPERATURE: 98.4 F | WEIGHT: 252.87 LBS

## 2023-08-15 LAB
GLUCOSE BLD STRIP.AUTO-MCNC: 91 MG/DL (ref 65–117)
GLUCOSE BLD STRIP.AUTO-MCNC: 96 MG/DL (ref 65–117)
SERVICE CMNT-IMP: NORMAL
SERVICE CMNT-IMP: NORMAL

## 2023-08-15 PROCEDURE — 6370000000 HC RX 637 (ALT 250 FOR IP): Performed by: INTERNAL MEDICINE

## 2023-08-15 PROCEDURE — 97116 GAIT TRAINING THERAPY: CPT | Performed by: PHYSICAL THERAPIST

## 2023-08-15 PROCEDURE — 82962 GLUCOSE BLOOD TEST: CPT

## 2023-08-15 PROCEDURE — 6360000002 HC RX W HCPCS: Performed by: INTERNAL MEDICINE

## 2023-08-15 PROCEDURE — 97535 SELF CARE MNGMENT TRAINING: CPT

## 2023-08-15 PROCEDURE — 99232 SBSQ HOSP IP/OBS MODERATE 35: CPT | Performed by: PSYCHIATRY & NEUROLOGY

## 2023-08-15 PROCEDURE — 2580000003 HC RX 258: Performed by: INTERNAL MEDICINE

## 2023-08-15 PROCEDURE — 97165 OT EVAL LOW COMPLEX 30 MIN: CPT

## 2023-08-15 PROCEDURE — 6360000002 HC RX W HCPCS: Performed by: NURSE PRACTITIONER

## 2023-08-15 PROCEDURE — 97161 PT EVAL LOW COMPLEX 20 MIN: CPT | Performed by: PHYSICAL THERAPIST

## 2023-08-15 RX ADMIN — MEROPENEM 1000 MG: 1 INJECTION, POWDER, FOR SOLUTION INTRAVENOUS at 00:26

## 2023-08-15 RX ADMIN — ASPIRIN 81 MG: 81 TABLET, CHEWABLE ORAL at 08:59

## 2023-08-15 RX ADMIN — HYDROMORPHONE HYDROCHLORIDE 0.5 MG: 1 INJECTION, SOLUTION INTRAMUSCULAR; INTRAVENOUS; SUBCUTANEOUS at 15:24

## 2023-08-15 RX ADMIN — ENOXAPARIN SODIUM 30 MG: 100 INJECTION SUBCUTANEOUS at 09:20

## 2023-08-15 RX ADMIN — HYDROMORPHONE HYDROCHLORIDE 0.5 MG: 1 INJECTION, SOLUTION INTRAMUSCULAR; INTRAVENOUS; SUBCUTANEOUS at 09:10

## 2023-08-15 RX ADMIN — ATORVASTATIN CALCIUM 40 MG: 40 TABLET, FILM COATED ORAL at 09:00

## 2023-08-15 RX ADMIN — MEROPENEM 1000 MG: 1 INJECTION, POWDER, FOR SOLUTION INTRAVENOUS at 09:10

## 2023-08-15 RX ADMIN — GABAPENTIN 300 MG: 300 CAPSULE ORAL at 09:00

## 2023-08-15 RX ADMIN — HYDROMORPHONE HYDROCHLORIDE 0.5 MG: 1 INJECTION, SOLUTION INTRAMUSCULAR; INTRAVENOUS; SUBCUTANEOUS at 01:41

## 2023-08-15 RX ADMIN — BUPROPION HYDROCHLORIDE 300 MG: 150 TABLET, EXTENDED RELEASE ORAL at 08:59

## 2023-08-15 ASSESSMENT — PAIN DESCRIPTION - ORIENTATION
ORIENTATION: LOWER
ORIENTATION: LOWER

## 2023-08-15 ASSESSMENT — PAIN - FUNCTIONAL ASSESSMENT: PAIN_FUNCTIONAL_ASSESSMENT: PREVENTS OR INTERFERES SOME ACTIVE ACTIVITIES AND ADLS

## 2023-08-15 ASSESSMENT — PAIN DESCRIPTION - LOCATION
LOCATION: BACK

## 2023-08-15 ASSESSMENT — PAIN SCALES - GENERAL
PAINLEVEL_OUTOF10: 7
PAINLEVEL_OUTOF10: 8
PAINLEVEL_OUTOF10: 7
PAINLEVEL_OUTOF10: 5

## 2023-08-15 ASSESSMENT — PAIN DESCRIPTION - DESCRIPTORS: DESCRIPTORS: ACHING

## 2023-08-15 NOTE — CARE COORDINATION
Transition of Care Plan:     RUR: 14     Disposition: Plan Home with ИРИНА order for [facility] SN/PT/[facility] and IV ABX through [facility]. Pt has IV ABX already set up and at home through [address]. 1:18 PM   CM completed chart review. Pt will be DC home. CM sent update to [facility] and they accepted [address]. CM will send them updates today to notify them of DC. If SNF or IPR: Date FOC offered:   Date FOC received:   Accepting facility:   Date authorization started with reference number:   Date authorization received and expires: Follow up appointments: PCP: Katarina Hughes: CM emailed CM Specialist to make appt. DME needed: n/a  Transportation at discharge: Family will transport  IM/IMM Medicare/ letter given: n/a [employer]  Is patient a  and connected with [facility]? N/a               If yes, was [employer] transfer form completed and VA notified? Caregiver Contact: Dad: Estela Rosemary: [phone]  Discharge Caregiver contacted prior to discharge? yes  Care Conference needed? N/a  Barriers to discharge:         Ana Anderson 0390     08/15/23 1317   Services At/After Discharge   [address], [place]) Resource Information Provided? No   Mode of Transport at Discharge Other (see comment)  (FAmily coming at 4 PM)   Hospital Transport Time of Discharge 1600   Confirm Follow Up Transport Family   Condition of Participation: Discharge Planning   The Plan for Transition of Care is related to the following treatment goals: Plan Home with [facility] and [facility]   The Patient and/or Patient Representative was provided with a Choice of Provider? Patient   The Patient and/Or Patient Representative agree with the Discharge Plan? Yes   Freedom of Choice list was provided with basic dialogue that supports the patient's individualized plan of care/goals, treatment preferences, and shares the quality data associated with the providers?   Yes
providers?   Yes

## 2023-08-15 NOTE — PROCEDURES
[facility]    Name:  Giovany Dee  MR#:  [id number]  :  1970  ACCOUNT #:  [de-identified]  DATE OF SERVICE:  2023    CLINICAL INDICATION:  The patient is a 62-year-old female with history of sudden passing out spells, the patient with back pain, the patient with possible orthostatic or convulsive seizures. EEG to rule out cortical abnormality. EEG CLASSIFICATION:  On this patient is normal, awake and asleep. DESCRIPTION OF THE RECORDING:  This is a 16-channel EEG recording on the patient to evaluate for possible seizures. This patient had a posteriorly located occipital alpha rhythm of 9-10 Hz that did attenuate some with eye opening in the awake state. During the recording, the patient did enter brief states of sleep with K complexes and sleep spindles seen in the central head regions. There were no areas of clear focal slowing, no clear spike or spike-and-wave discharges seen and no electrographic spells of any type seen. Hyperventilation was not performed. Photic stimulation produced a mild driving response in the posterior head regions. INTERPRETATION:  This is a abnormal electroencephalogram with the patient awake and asleep showing no clear focal abnormalities, no clear spike or spike-and-wave discharges and no recorded electrographic spells of any type seen. Clinical correlation recommended. Kya Rios MD      [id number]  D:  2023 22:04  T:  08/15/2023 2:37  JOB #:  [id number]  CC:   Kya Rios MD

## 2023-08-15 NOTE — PLAN OF CARE
Problem: Physical Therapy - Adult  Goal: By Discharge: Performs mobility at highest level of function for planned discharge setting. See evaluation for individualized goals. Description: FUNCTIONAL STATUS PRIOR TO ADMISSION: Patient was independent and active without use of DME prior to initial surgery in July. Since surgery patient has required some assistance with bathing and dressing and assistance with daily tasks as needed. HOME SUPPORT PRIOR TO ADMISSION: The patient lived alone with family to provide assistance as needed. Physical Therapy Goals  Initiated 8/15/2023  1. Patient will move from supine to sit and sit to supine  in bed with independence within 7 day(s). 2.  Patient will transfer from bed to chair and chair to bed with independence using the least restrictive device within 7 day(s). 3.  Patient will perform sit to stand with independence within 7 day(s). 4.  Patient will ambulate with supervision/set-up for 200 feet with the least restrictive device within 7 day(s). 5.  Patient will ascend/descend 3 stairs with 1 handrail(s) with supervision/set-up within 7 day(s). Outcome: Progressing   PHYSICAL THERAPY EVALUATION    Patient: Altagracia Jj (65 y.o. female)  Date: 8/15/2023  Primary Diagnosis: Syncope and collapse [R55]  Hypokalemia [E87.6]  Chronic midline low back pain without sciatica [M54.50, G89.29]       Precautions: ROM Restrictions         Spinal Precautions: No Bending, No Lifting, No Twisting Sternal Precautions: 10# Lifting Restrictions        ASSESSMENT :   DEFICITS/IMPAIRMENTS:   The patient is limited by decreased independence in ADLs, activity tolerance, increased pain levels . Patient has been recovering from back surgery and has had complications during recovery period. Patient semi-goldstein's upon arrival; agreeable to mobility. Reviewed back precautions and patient able to recall 3/3.  Able to come to EOB with use of rail only and has good sitting

## 2023-08-15 NOTE — PLAN OF CARE
Problem: Neurosensory - Adult  Goal: Absence of seizures  8/14/2023 2224 by Rashaad Tavarez RN  Outcome: Progressing  Goal: Remains free of injury related to seizures activity  8/14/2023 2224 by Rashaad Tavarez RN  Outcome: Progressing     Problem: Physical Therapy - Adult  Goal: By Discharge: Performs mobility at highest level of function for planned discharge setting. See evaluation for individualized goals. Description: FUNCTIONAL STATUS PRIOR TO ADMISSION: Patient was independent and active without use of DME prior to initial surgery in July. Since surgery patient has required some assistance with bathing and dressing and assistance with daily tasks as needed. HOME SUPPORT PRIOR TO ADMISSION: The patient lived alone with family to provide assistance as needed. Physical Therapy Goals  Initiated 8/15/2023  1. Patient will move from supine to sit and sit to supine  in bed with independence within 7 day(s). 2.  Patient will transfer from bed to chair and chair to bed with independence using the least restrictive device within 7 day(s). 3.  Patient will perform sit to stand with independence within 7 day(s). 4.  Patient will ambulate with supervision/set-up for 200 feet with the least restrictive device within 7 day(s). 5.  Patient will ascend/descend 3 stairs with 1 handrail(s) with supervision/set-up within 7 day(s).     8/15/2023 1055 by Kristina Devine PT  Outcome: Progressing     Problem: Discharge Planning  Goal: Discharge to home or other facility with appropriate resources  8/15/2023 1147 by Kalia Ruth RN  Outcome: Adequate for Discharge  8/14/2023 2224 by Rashaad Tavarez RN  Outcome: Progressing     Problem: Pain  Goal: Verbalizes/displays adequate comfort level or baseline comfort level  8/15/2023 1147 by Kalia Ruth RN  Outcome: Adequate for Discharge  8/14/2023 2224 by Rashaad Tavarez RN  Outcome: Progressing     Problem: Safety - Adult  Goal: Free from fall

## 2023-08-15 NOTE — DISCHARGE INSTRUCTIONS
All of your medications from before your hospitalization are the same. Please take all of your medications as prescribed. If prescribed any medications, please read all pharmacy instructions and inserts. Inform your doctor and pharmacist about all other medications and alternative therapies. Please follow up with your PCP in 1-2 weeks to be reassessed after your hospital stay. Please also follow up with your specialists as scheduled. If you start feeling any symptoms similar to what brought you into the hospital, please come back to the ED to be re-evaluated.

## 2023-08-15 NOTE — PLAN OF CARE
Problem: Discharge Planning  Goal: Discharge to home or other facility with appropriate resources  8/14/2023 2224 by Anshul Villalta RN  Outcome: Progressing  8/14/2023 1444 by Vita Briceno RN  Outcome: Progressing     Problem: Pain  Goal: Verbalizes/displays adequate comfort level or baseline comfort level  8/14/2023 2224 by Anshul Villalta RN  Outcome: Progressing  8/14/2023 1444 by Vita Briceno RN  Outcome: Progressing     Problem: Safety - Adult  Goal: Free from fall injury  8/14/2023 2224 by Anshul Villalta RN  Outcome: Progressing  8/14/2023 1444 by Vita Briceno RN  Outcome: Progressing     Problem: ABCDS Injury Assessment  Goal: Absence of physical injury  8/14/2023 2224 by Anshul Villalta RN  Outcome: Progressing  8/14/2023 1444 by Vita Briceno RN  Outcome: Progressing     Problem: Neurosensory - Adult  Goal: Achieves stable or improved neurological status  8/14/2023 2224 by Anshul Villalta RN  Outcome: Progressing  8/14/2023 1444 by Vita Briceno RN  Outcome: Progressing  Goal: Absence of seizures  8/14/2023 2224 by Anshul Villalta RN  Outcome: Progressing  8/14/2023 1444 by Vita Briceno RN  Outcome: Progressing  Goal: Remains free of injury related to seizures activity  8/14/2023 2224 by Anshul Villalta RN  Outcome: Progressing  8/14/2023 1444 by Vita Briceno RN  Outcome: Progressing  Goal: Achieves maximal functionality and self care  8/14/2023 1444 by Vita Briceno RN  Outcome: Progressing     Problem: Skin/Tissue Integrity - Adult  Goal: Incisions, wounds, or drain sites healing without S/S of infection  8/14/2023 2224 by Anshul Villalta RN  Outcome: Progressing  8/14/2023 1444 by Vita Briceno RN  Outcome: Progressing     Problem: Hematologic - Adult  Goal: Maintains hematologic stability  8/14/2023 2224 by Anshul Villalta RN  Outcome: Progressing  8/14/2023 1444 by Vita Briceno RN  Outcome: Progressing     Problem: Discharge Planning  Goal: Discharge to home or

## 2023-08-18 NOTE — ADT AUTH CERT
DONNA BOTELLO     PLEASE GET INTO THE SPIRIT OF WHEN YOU DENY MED NESS YOU AUTO SEND THE OBS CASE NOW WE HAVE TO WAIT FOR YOU TO CREATE ONE BECAUSE [employer] WILL NOT - WAITING LAGS OUR CASES     SEND OP TODAY/ASAP ON    [id number] IP DENIED MED NESS -  [address] [facility]

## 2023-08-19 LAB
BACTERIA SPEC CULT: NORMAL
SERVICE CMNT-IMP: NORMAL
SERVICE CMNT-IMP: NORMAL

## 2023-08-19 NOTE — PROGRESS NOTES
Attempted to schedule PCP hospital follow up appointment. Unable to reach anyone, unable to leave voicemail. Pending patient discharge.  Celena Gomez, Care Management Assistant
Consult  REFERRED BY:  Milagros Larose MD    CHIEF COMPLAINT: Patient with sudden blacking out spell that occurred when she was getting dressed up by her stepmother and slumped over      Subjective:     Zack Sanchez is a 47 y.o. right-handed -American female we are seeing as a new patient, at the request of the hospitalist, for evaluation of new problem of patient having sudden episode of passing out after she was getting bathed and dressed by her stepmother yesterday and had just had a redo back operation about a week ago and first back surgery about a month ago. Patient had a slight tremor of 1 hand but no major seizure activity, no tongue biting and no incontinence. She is on Wellbutrin. Milligrams a day aspirin Tenoretic Lasix anti-inflammatories Antivert for dizziness, Lasix gabapentin and Fioricet CTA of the head and neck done in 2019 showed no large vessel occlusion and MRI was negative for stroke when she had a TIA. She denies any unusual chest pain or palpitations with this event. She said she felt lethargic prior to the event but does remember much else. She had no focal neurologic deficits before or after either. She is back to her normal baseline now and has no neurologic deficit, and her EEG this morning was perfectly normal and her Dopplers are normal and so is her MRI scan. Patient can ambulate with a rollator and a back brace. She has had 2 back operations and seems to be fused from about L2-L4 5. She is still recovering from the therapy and the second operation. She is never had a seizure before, had a history of a TIA back in 2019 but no stroke on MRI scan. She is diabetic with diabetic neuropathy.       Past Medical History:   Diagnosis Date    Constipation     Essential hypertension 2010    Headache     Hypertension     TED (obstructive sleep apnea)     Stroke [facility])     TIA (transient ischemic attack) 2011 2012      Past Surgical History:   Procedure Laterality Date
End of Shift Note    Bedside shift change report given to  PONCHO Rodriguez (oncoming nurse) by Min Wick RN (offgoing nurse). Report included the following information Nurse Handoff Report      Shift worked:  Nights   Shift summary and any significant changes:     New admit. Pt AOX4 and VSS. IV dilaudid Q4 PRN. Pt potassium 3.2 from 2.5. No other significant changes over night. EEG pending. Concerns for physician to address:  N/A   Zone phone for oncoming shift:   [phone]     Patient Information  Maribell Ochoa  47 y.o.  8/13/2023  3:55 PM by Nancy Kirk MD. Maribell Ochoa was admitted from Home    Problem List  Patient Active Problem List    Diagnosis Date Noted    Syncope and collapse 08/13/2023    TIA (transient ischemic attack) 01/01/2019    Low back pain 07/22/2016    Pain of right lower extremity 07/22/2016    Morbid obesity ([address]) 08/05/2013     Past Medical History:   Diagnosis Date    Constipation     Essential hypertension 2010    Headache     Hypertension     TED (obstructive sleep apnea)     Stroke ([address])     TIA (transient ischemic attack) 2011 2012       Core Measures:  CVA: No No  CHF:No No  PNA:NoNo    Activity:     Number times ambulated in hallways past shift: 0  Number of times OOB to chair past shift: 2    Cardiac:   Cardiac Monitoring: Yes      Cardiac Rhythm: Sinus rhythm    Access:   Current line(s): PIV and PICC  Central Line? Yes Placement date    Reason Medically Necessary Long term antiobiotics  PICC LINE? Yes Placement date   Reason Medically Necessary       Genitourinary:   Urinary status: voiding   Urinary Catheter? No Placement   Date    Reason Medically Necessary     Respiratory:   O2 Device: None (Room air)  Chronic home O2 use?: no  Incentive spirometer at bedside: no       GI:  Last BM (including prior to admit): 08/13/23  Current diet:  ADULT DIET; Regular; 4 carb choices (60 gm/meal);  Low Sodium (2 gm)  Passing flatus: yes  Tolerating current diet: yes
End of Shift Note    Bedside shift change report given to Darshan Tyler (oncoming nurse) by Ashli Zuleta RN (offgoing nurse). Report included the following information Nurse Handoff Report, ED SBAR, Intake/Output, MAR, Cardiac Rhythm  , Quality Measures, and Neuro Assessment      Shift worked:  7a-7p   Shift summary and any significant changes:    Patient had EEG, Vascular Duplex, and PICC line dressing change today. Testing results are pending. Patient still needs MRI, MRI screening is completed and faxed. One time dose of PO Ativan prior to MRI available. Patient also received 1 dose of IV dilaudid for back pain. Concerns for physician to address:  Results from tests when available. Zone phone for oncoming shift:   [phone]     Patient Information  Reuel Schirmer  47 y.o.  8/13/2023  3:55 PM by Rebel Carlin MD. Reuel Schirmer was admitted from Home    Problem List  Patient Active Problem List    Diagnosis Date Noted    Convulsive syncope 08/14/2023    Bilateral carotid artery stenosis 08/14/2023    Acute alteration in mental status 08/14/2023    Diabetic peripheral neuropathy associated with type 2 diabetes mellitus ([address]) 08/14/2023    Lumbar post-laminectomy syndrome 08/14/2023    Syncope and collapse 08/13/2023    TIA (transient ischemic attack) 01/01/2019    Low back pain 07/22/2016    Pain of right lower extremity 07/22/2016    Morbid obesity ([address]) 08/05/2013     Past Medical History:   Diagnosis Date    Constipation     Essential hypertension 2010    Headache     Hypertension     TED (obstructive sleep apnea)     Stroke ([address])     TIA (transient ischemic attack) 2011 2012       Core Measures:  CVA: No No  CHF:No No  PNA:NoNo    Activity:     Number times ambulated in hallways past shift: 0  Number of times OOB to chair past shift: 0    Cardiac:   Cardiac Monitoring: Yes      Cardiac Rhythm: Sinus rhythm    Access:   Current line(s): PIV and PICC  Central Line?  Yes Placement date Patient
End of Shift Note    Bedside shift change report given to Pablo Madera RN (oncoming nurse) by Shai Ludwig RN (offgoing nurse). Report included the following information Nurse Handoff Report, ED SBAR, Intake/Output, MAR, Cardiac Rhythm  , Quality Measures, and Neuro Assessment      Shift worked: nights   Shift summary and any significant changes:    Pain management continues dilaudid every 4 hours PRN  Mri NEG  CT-NEG     Concerns for physician to address:  Results from tests when available. Zone phone for oncoming shift:   [phone]     Patient Information  Kimberli Finch  47 y.o.  8/13/2023  3:55 PM by Danika Khan MD. Kimberli Finch was admitted from Home    Problem List  Patient Active Problem List    Diagnosis Date Noted    Convulsive syncope 08/14/2023    Bilateral carotid artery stenosis 08/14/2023    Acute alteration in mental status 08/14/2023    Diabetic peripheral neuropathy associated with type 2 diabetes mellitus ([address]) 08/14/2023    Lumbar post-laminectomy syndrome 08/14/2023    Syncope and collapse 08/13/2023    TIA (transient ischemic attack) 01/01/2019    Low back pain 07/22/2016    Pain of right lower extremity 07/22/2016    Morbid obesity ([address]) 08/05/2013     Past Medical History:   Diagnosis Date    Constipation     Essential hypertension 2010    Headache     Hypertension     TED (obstructive sleep apnea)     Stroke ([address])     TIA (transient ischemic attack) 2011 2012       Core Measures:  CVA: No No  CHF:No No  PNA:NoNo    Activity:  Activity: Ambulate in room  Number times ambulated in hallways past shift: 0  Number of times OOB to chair past shift: 0    Cardiac:   Cardiac Monitoring: Yes      Cardiac Rhythm: Sinus rhythm    Access:   Current line(s): PIV and PICC  Central Line? Yes Placement date Patient admitted from home with PICC line. Reason Medically Necessary Long term ABX  PICC LINE? Yes Placement date Admitted from home with PICC Line.  Reason Medically Necessary Long term
Hospitalist Progress Note    NAME:   Giovany Dee   : 1970   MRN: [id number]     Date/Time: 2023 7:41 PM  Patient PCP: Dev Enriquez MD    Estimated discharge date: 8/15  Barriers: MRI results      Assessment / Plan:    Syncope  Hypokalemia  Lactic acidosis  Seizure? Telemetry  Hold diuretics including Lasix   Light hydration  Trend BMP  replete potassium aggressively  Consult neurology for possible seizure, elevated lactate  Hold off on anti-epileptics for now  Order EEG  Check orthostatics daily  PT OT  Patient also on multiple medications that could cause syncope including Flexeril, atenolol (may lead to bradycardia), gabapentin, Antivert. Cautious with polypharmacy  MRI done, results pending        History of  L2-3 laminectomy,, L3-4 revision laminectomy, L2-4 OLLIF with L2 through Pelvis posterior spinal fusion with instrumented fixation, removal of hardare and exploration of prior fusion     Outpatient follow-up with orthopedic surgery  Patient takes ertapenem at home but we do not have this on formulary  Consult pharmacy to substitute ertapenem-discussed with CM resuming HH orders today or tomorrow  PICC line in place  Continue with home medications of gabapentin and Flexeril  Cautious with medications given syncope  PT/OT     History of Depression     Continue with Wellbutrin     History of hypertension     Hold atenolol and diuretic for now     History of TIA     Continue with aspirin     History of diabetes     Currently on Trulicity  ADA diet  Low-dose insulin sliding scale  Accu-Cheks 3 times daily [facility]     Medical Decision Making:   I personally reviewed labs: CBC, BMP  I personally reviewed imaging: Chest x-ray  I personally reviewed EKG:  Toxic drug monitoring:   Toxic drug monitoring:   Discussed case with:         Code Status: Full  DVT Prophylaxis: Lovenox   GI Prophylaxis:     Subjective:     Chief Complaint / Reason for Physician Visit  Afebrile. No new complaints.
MRI PENDING    Completion of MRI Screening Sheet    Fax to [phone] when completed    Please call [phone]  When this is done    Thank You
Occupational Therapy        Chart reviewed up to date and orders acknowledged. Attempted to see pt for initial OT evaluation. Per pt, she does not have her aspen back brace. Per pt, had lumbar fusion on 7/10/2023 and is within her required back precautions, had washout for suspected hardware infection ~7/25/2023. Pt not safe to mobilize without back brace. Per , he will bring from home. OT Evaluation aborted and will follow up later as able. 1517: Second attempt made to see pt for OT evaluation. Pt receiving medical procedure at bedside and is unavailable. Will defer and follow up tomorrow.      Chago Carrington, OTD, OTR/L  Total time spent unbilled: 5 mins
Patient discharged home with family. Discharge instructions reviewed with patient who voices understanding.  All personal belongings taken with patient,
Physical Therapy     Second attempt made to see pt for PT evaluation. Pt receiving medical procedure at bedside and is unavailable. Will defer and follow up tomorrow.      Gretchen Griffin PT, DPT, NCS
Physical Therapy    Chart reviewed up to date and orders acknowledged. Attempted to see pt for PT evaluation. Per pt, she does not have her aspen back brace. Per pt, had lumbar fusion on 7/10/2023 and is within her required back precautions, had washout for suspected hardware infection ~7/25/2023. Pt not safe to mobilize without back brace. Per , he will bring from home. Evaluation aborted and will follow up later as able. Cheo Sin PT, DPT, NCS  Total time spent: 5min unbilled.
Physician Progress Note      PATIENT:               Frantz Cowart  CSN #:                  [id number]  :                       1970  ADMIT DATE:       2023 3:55 PM  [address] [facility] DATE:        8/15/2023 3:55 PM  RESPONDING  PROVIDER #:        Holden Anadn MD          QUERY TEXT:    Patient admitted with Syncope. noted to have Hx of Spinal surgery on 7/10 and   subsequent surgery for incisional infection on 23. Patient noted to be   currently on IV antibiotics for postop incisional infection from prior spine   surgery. Patient noted to have K+ 2.5; Ca++: 8.4; and POC Lac acid: 3.57. Per   Neurology consult note dated , \"Patient with probable convulsive syncope   episode secondary to recent postop state and pain and probably some mild   dehydration and medication effect due to all of her medications\". If possible, please document in progress notes and discharge summary after   study the etiology of the syncope: The medical record reflects the following:  Risk Factors: Hx: Spinal Surgery 7/10 and ; HTN; CVA. ... C/o Syncope  Clinical Indicators: CT Head: NEG; MRI Brain: NEG; H/H: 9.0/28.5 ^ 8.4/27.0;   Na+: 142; K+: 2.5 ^ 3.2; gluc: 113; Ca++: 8.4 (L); POC Lac acid: 3.57; BCx: NG   x 2 days. ... Danial [place] Danial [place] Orthostatic VS: Supine: Bp:137/72 hr: 89; Standing: Bp: 152/84;   hr: 99     AP: Syncope  Hypokalemia  Lactic acidosis  Seizure?  Neuro PN: Patient with probable convulsive syncope episode secondary to   recent postop state and pain and probably some mild dehydration and medication   effect due to all of her medications  Her EEG is normal, and her CT of the head was normal, and her Dopplers and MRI   are pending.     Treatment: Telemetry; Labs; Light hydration; Trend BMP; replete potassium   aggressively; Order EEG  Check Orthostatics daily; PT OT    Thank you,    Claudette Coats  CDI  Options provided:  -- Syncope due to post-op pain  -- Syncope due to dehydration  -- Syncope due to
Routine eeg completed.
by, Please document evidence. -- Lactic acidosis was ruled out  -- Other - I will add my own diagnosis  -- Disagree - Not applicable / Not valid  -- Disagree - Clinically unable to determine / Unknown  -- Refer to Clinical Documentation Reviewer    PROVIDER RESPONSE TEXT:    Lactic acidosis was ruled out after study.     Query created by: Eber Infante on 8/15/2023 1:06 PM      Electronically signed by:  Zahida Torres MD 8/15/2023 3:37 PM
should be used as a record of what a patient does, not as a record of what a patient could do. 2. The main aim is to establish degree of independence from any help, physical or verbal, however minor and for whatever reason. 3. The need for supervision renders the patient not independent. 4. A patient's performance should be established using the best available evidence. Asking the patient, friends/relatives and nurses are the usual sources, but direct observation and common sense are also important. However direct testing is not needed. 5. Usually the patient's performance over the preceding 24-48 hours is important, but occasionally longer periods will be relevant. 6. Middle categories imply that the patient supplies over 50 per cent of the effort. 7. Use of aids to be independent is allowed. Score Interpretation (from [address] [place] [address])    Independent   60-79 Minimally independent   40-59 Partially dependent   20-39 Very dependent   <20 Totally dependent     -Jacqueline Lee., Barthel, DAdonisW. (1965). Functional evaluation: the Barthel Index. [address] ([address] (1997). The Barthel activities of daily living index: self-reporting versus actual performance in the old (> or = 75 years). Journal of [facility] 45(7), [address], J.J.M.F, Hayden Bhatti., Carrie Olvera. (1999). Measuring the change in disability after inpatient rehabilitation; comparison of the responsiveness of the Barthel Index and Functional [place] Measure. Journal of Neurology, Neurosurgery, and Psychiatry, 66(4), 315-040. Tatiana Villeda, N.J.A, BING Banerjee, & Elissa Campos MAdonisA. (2004) Assessment of post-stroke quality of life in cost-effectiveness studies: The usefulness of the Barthel Index and the EuroQoL-5D.  [facility] of [address] [place] [address], 13, 566-87

## 2023-08-25 ENCOUNTER — CLINICAL DOCUMENTATION (OUTPATIENT)
Age: 53
End: 2023-08-25

## 2023-08-25 NOTE — PROGRESS NOTES
I called patient, let her know the MRI scan was normal, and she said thank you, and will continue her current care

## 2023-09-12 LAB
BACTERIA SPEC CULT: NORMAL
BACTERIA SPEC CULT: NORMAL
SERVICE CMNT-IMP: NORMAL